# Patient Record
Sex: FEMALE | Race: WHITE | NOT HISPANIC OR LATINO | Employment: OTHER | URBAN - METROPOLITAN AREA
[De-identification: names, ages, dates, MRNs, and addresses within clinical notes are randomized per-mention and may not be internally consistent; named-entity substitution may affect disease eponyms.]

---

## 2018-05-21 ENCOUNTER — OFFICE VISIT (OUTPATIENT)
Dept: FAMILY MEDICINE CLINIC | Facility: CLINIC | Age: 73
End: 2018-05-21

## 2018-05-21 VITALS
HEIGHT: 61 IN | WEIGHT: 120.2 LBS | DIASTOLIC BLOOD PRESSURE: 82 MMHG | BODY MASS INDEX: 22.69 KG/M2 | HEART RATE: 65 BPM | SYSTOLIC BLOOD PRESSURE: 120 MMHG | OXYGEN SATURATION: 98 % | TEMPERATURE: 98.5 F

## 2018-05-21 DIAGNOSIS — Z00.00 ANNUAL PHYSICAL EXAM: ICD-10-CM

## 2018-05-21 DIAGNOSIS — M85.80 OSTEOPENIA DETERMINED BY X-RAY: ICD-10-CM

## 2018-05-21 DIAGNOSIS — Z79.899 ENCOUNTER FOR LONG-TERM (CURRENT) USE OF MEDICATIONS: ICD-10-CM

## 2018-05-21 DIAGNOSIS — E05.00 GRAVES DISEASE: Primary | ICD-10-CM

## 2018-05-21 DIAGNOSIS — E55.9 VITAMIN D DEFICIENCY: ICD-10-CM

## 2018-05-21 DIAGNOSIS — E78.5 HYPERLIPIDEMIA, UNSPECIFIED HYPERLIPIDEMIA TYPE: ICD-10-CM

## 2018-05-21 LAB
25(OH)D3+25(OH)D2 SERPL-MCNC: 58.2 NG/ML (ref 30–100)
ALBUMIN SERPL-MCNC: 4.5 G/DL (ref 3.5–5.2)
ALBUMIN/GLOB SERPL: 1.7 G/DL
ALP SERPL-CCNC: 73 U/L (ref 39–117)
ALT SERPL-CCNC: 16 U/L (ref 1–33)
AST SERPL-CCNC: 17 U/L (ref 1–32)
BILIRUB SERPL-MCNC: 0.7 MG/DL (ref 0.1–1.2)
BUN SERPL-MCNC: 14 MG/DL (ref 8–23)
BUN/CREAT SERPL: 17.5 (ref 7–25)
CALCIUM SERPL-MCNC: 9.7 MG/DL (ref 8.6–10.5)
CHLORIDE SERPL-SCNC: 104 MMOL/L (ref 98–107)
CHOLEST SERPL-MCNC: 179 MG/DL (ref 0–200)
CHOLEST/HDLC SERPL: 1.99 {RATIO}
CO2 SERPL-SCNC: 29.8 MMOL/L (ref 22–29)
CREAT SERPL-MCNC: 0.8 MG/DL (ref 0.57–1)
ERYTHROCYTE [DISTWIDTH] IN BLOOD BY AUTOMATED COUNT: 13 % (ref 11.7–13)
GFR SERPLBLD CREATININE-BSD FMLA CKD-EPI: 70 ML/MIN/1.73
GFR SERPLBLD CREATININE-BSD FMLA CKD-EPI: 85 ML/MIN/1.73
GLOBULIN SER CALC-MCNC: 2.6 GM/DL
GLUCOSE SERPL-MCNC: 91 MG/DL (ref 65–99)
HCT VFR BLD AUTO: 43.9 % (ref 35.6–45.5)
HDLC SERPL-MCNC: 90 MG/DL (ref 40–60)
HGB BLD-MCNC: 13.9 G/DL (ref 11.9–15.5)
LDLC SERPL CALC-MCNC: 76 MG/DL (ref 0–100)
MCH RBC QN AUTO: 31.5 PG (ref 26.9–32)
MCHC RBC AUTO-ENTMCNC: 31.7 G/DL (ref 32.4–36.3)
MCV RBC AUTO: 99.5 FL (ref 80.5–98.2)
PLATELET # BLD AUTO: 182 10*3/MM3 (ref 140–500)
POTASSIUM SERPL-SCNC: 4.1 MMOL/L (ref 3.5–5.2)
PROT SERPL-MCNC: 7.1 G/DL (ref 6–8.5)
RBC # BLD AUTO: 4.41 10*6/MM3 (ref 3.9–5.2)
SODIUM SERPL-SCNC: 144 MMOL/L (ref 136–145)
TRIGL SERPL-MCNC: 63 MG/DL (ref 0–150)
TSH SERPL DL<=0.005 MIU/L-ACNC: 1.75 MIU/ML (ref 0.27–4.2)
VLDLC SERPL CALC-MCNC: 12.6 MG/DL (ref 5–40)
WBC # BLD AUTO: 3.69 10*3/MM3 (ref 4.5–10.7)

## 2018-05-21 PROCEDURE — 99203 OFFICE O/P NEW LOW 30 MIN: CPT | Performed by: NURSE PRACTITIONER

## 2018-05-21 RX ORDER — ESTRADIOL 0.1 MG/G
1 CREAM VAGINAL WEEKLY
COMMUNITY
Start: 2017-12-19 | End: 2018-11-01 | Stop reason: ALTCHOICE

## 2018-05-21 RX ORDER — CLONAZEPAM 1 MG/1
1 TABLET ORAL NIGHTLY
COMMUNITY
End: 2018-08-23 | Stop reason: SDUPTHER

## 2018-05-21 RX ORDER — VALACYCLOVIR HYDROCHLORIDE 500 MG/1
500 TABLET, FILM COATED ORAL AS NEEDED
COMMUNITY
Start: 2017-12-19 | End: 2018-11-29 | Stop reason: SDUPTHER

## 2018-05-21 NOTE — PROGRESS NOTES
Subjective   Zehra Gong is a 72 y.o. female who presents today for:    Annual Exam    Ms. Gong presents today to establish care at this practice. She has several concerns today, centering around will I be able to prescribe her medications for her so that she no longer needs to see her GYN and also endocrinologist. 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 She is seen by Dr. Sawyer for her oncology issues. She had seen Dr. Winkler in the past for her thyroid and Dr. Childers was her GYN.  She does not drink or smoke. Her family history is significant for Lung cancer (father) and breast cancer (mother). She is up to date on her mammogram and dexa scan. She states she has never had any immunizations as an adult.     I have reviewed the patient's medical history in detail and updated the computerized patient record.       Ms. Gong  reports that she has never smoked. She has never used smokeless tobacco. She reports that she does not drink alcohol or use drugs.     Allergies   Allergen Reactions   • Methimazole Other (See Comments)     EYES BURNING   • Adhesive Tape Rash   • Sulfa Antibiotics Rash       Current Outpatient Prescriptions:   •  calcium carbonate-vitamin d 600-400 MG-UNIT per tablet, Take 1 tablet by mouth Daily., Disp: , Rfl:   •  clonazePAM (KlonoPIN) 1 MG tablet, Take 1 mg by mouth Every Night., Disp: , Rfl:   •  estradiol (ESTRACE) 0.1 MG/GM vaginal cream, Insert 1 g into the vagina 1 (One) Time Per Week., Disp: , Rfl:   •  valACYclovir (VALTREX) 500 MG tablet, Take 500 mg by mouth As Needed., Disp: , Rfl:   •  Multiple Vitamin (THERAGRAN PO), Take 1 tablet by mouth., Disp: , Rfl:       Review of Systems   Constitutional: Negative.    HENT: Negative.    Respiratory: Negative.    Cardiovascular: Negative.    Gastrointestinal: Negative.    Endocrine: Negative.    Genitourinary: Negative.    Musculoskeletal: Negative.    Skin:  "Negative.    Neurological: Negative.    Psychiatric/Behavioral: Negative.          Objective   Vitals:    05/21/18 0915   BP: 120/82   BP Location: Left arm   Patient Position: Sitting   Cuff Size: Small Adult   Pulse: 65   Temp: 98.5 °F (36.9 °C)   TempSrc: Oral   SpO2: 98%   Weight: 54.5 kg (120 lb 3.2 oz)   Height: 154.9 cm (61\")     Physical Exam   Constitutional: She is oriented to person, place, and time. She appears well-developed and well-nourished.   HENT:   Head: Normocephalic.   Right Ear: External ear normal.   Left Ear: External ear normal.   Nose: Nose normal.   Mouth/Throat: Oropharynx is clear and moist.   Neck: Normal range of motion. Neck supple. No JVD present. No tracheal deviation present. No thyromegaly present.   Cardiovascular: Normal rate, regular rhythm, normal heart sounds and intact distal pulses.    Pulmonary/Chest: Effort normal and breath sounds normal.   Abdominal: Soft. Bowel sounds are normal.   Musculoskeletal: Normal range of motion. She exhibits no edema.   Lymphadenopathy:     She has no cervical adenopathy.   Neurological: She is alert and oriented to person, place, and time.   Skin: Skin is warm and dry.   Psychiatric:   No acute distress   Vitals reviewed.            Zehra was seen today for annual exam.    Diagnoses and all orders for this visit:    Graves disease  -     TSH    Hyperlipidemia, unspecified hyperlipidemia type  -     Lipid Panel With / Chol / HDL Ratio    Osteopenia determined by x-ray    Vitamin D deficiency  -     Vitamin D 25 Hydroxy    Annual physical exam  -     CBC (No Diff)  -     Comprehensive Metabolic Panel    Encounter for long-term (current) use of medications  -     [id number] 7+Oxycodone-Bund - Urine, Clean Catch    1. I have reviewed her medical records that are available in the Care Everywhere system.   2. She has provide a urine for drug screen to get refills on the Klonopin. She is also to have a CBC, lipids and CMP today.   3. She is to return " in 3 months for CSE.

## 2018-05-22 LAB
AMPHETAMINES UR QL SCN: NEGATIVE NG/ML
BARBITURATES UR QL SCN: NEGATIVE NG/ML
BENZODIAZ UR QL SCN: NEGATIVE NG/ML
BZE UR QL: NEGATIVE NG/ML
CANNABINOIDS UR QL SCN: NEGATIVE NG/ML
OPIATES UR QL SCN: NEGATIVE NG/ML
OXYCODONE+OXYMORPHONE UR QL SCN: NEGATIVE NG/ML
PCP UR QL: NEGATIVE NG/ML

## 2018-08-23 ENCOUNTER — OFFICE VISIT (OUTPATIENT)
Dept: FAMILY MEDICINE CLINIC | Facility: CLINIC | Age: 73
End: 2018-08-23

## 2018-08-23 VITALS
TEMPERATURE: 98.7 F | HEIGHT: 61 IN | WEIGHT: 120.9 LBS | BODY MASS INDEX: 22.83 KG/M2 | OXYGEN SATURATION: 97 % | SYSTOLIC BLOOD PRESSURE: 102 MMHG | DIASTOLIC BLOOD PRESSURE: 76 MMHG | HEART RATE: 72 BPM

## 2018-08-23 DIAGNOSIS — G47.00 INSOMNIA, UNSPECIFIED TYPE: Primary | ICD-10-CM

## 2018-08-23 PROBLEM — F41.9 ANXIETY: Status: ACTIVE | Noted: 2018-08-23

## 2018-08-23 PROCEDURE — 99214 OFFICE O/P EST MOD 30 MIN: CPT | Performed by: NURSE PRACTITIONER

## 2018-08-23 RX ORDER — CLONAZEPAM 1 MG/1
1 TABLET ORAL NIGHTLY
Qty: 30 TABLET | Refills: 5 | Status: SHIPPED | OUTPATIENT
Start: 2018-08-23 | End: 2018-11-29 | Stop reason: SDUPTHER

## 2018-08-23 NOTE — PROGRESS NOTES
"Subjective   Zehra Gong is a 72 y.o. female who presents today for:    Insomnia (CSE)    Ms. Gong presents today for a CSE to get refills on Clonazepam that she takes for insomnia. She was prescribed Clonazepam 1 mg nightly by her previous PCP. She denies having any anxiety issues.      I have reviewed the patient's medical history in detail and updated the computerized patient record.      Ms. Gong  reports that she has never smoked. She has never used smokeless tobacco. She reports that she does not drink alcohol or use drugs.     Allergies   Allergen Reactions   • Methimazole Other (See Comments)     EYES BURNING   • Adhesive Tape Rash   • Sulfa Antibiotics Rash       Current Outpatient Prescriptions:   •  calcium carbonate-vitamin d 600-400 MG-UNIT per tablet, Take 1 tablet by mouth Daily., Disp: , Rfl:   •  clonazePAM (KlonoPIN) 1 MG tablet, Take 1 tablet by mouth Every Night., Disp: 30 tablet, Rfl: 5  •  estradiol (ESTRACE) 0.1 MG/GM vaginal cream, Insert 1 g into the vagina 1 (One) Time Per Week., Disp: , Rfl:   •  Multiple Vitamin (MULTI VITAMIN PO), Take  by mouth., Disp: , Rfl:   •  valACYclovir (VALTREX) 500 MG tablet, Take 500 mg by mouth As Needed., Disp: , Rfl:       Review of Systems   Constitutional: Negative.    Psychiatric/Behavioral: Positive for sleep disturbance. The patient is not nervous/anxious.          Objective   Vitals:    08/23/18 0845   BP: 102/76   BP Location: Left arm   Patient Position: Sitting   Cuff Size: Adult   Pulse: 72   Temp: 98.7 °F (37.1 °C)   TempSrc: Oral   SpO2: 97%   Weight: 54.8 kg (120 lb 14.4 oz)   Height: 154.9 cm (60.98\")     Physical Exam   Constitutional: She is oriented to person, place, and time. She appears well-developed and well-nourished.   Cardiovascular: Normal rate, regular rhythm and normal heart sounds.    Pulmonary/Chest: Effort normal and breath sounds normal.   Neurological: She is alert and oriented to person, place, and time.   Skin: " Skin is warm and dry.   Psychiatric: She has a normal mood and affect. Judgment and thought content normal. She is hyperactive. Cognition and memory are normal.   Vitals reviewed.            Zehra was seen today for insomnia.    Diagnoses and all orders for this visit:    Insomnia, unspecified type  -     clonazePAM (KlonoPIN) 1 MG tablet; Take 1 tablet by mouth Every Night.    1. Ms. Gong is here today to get refills on clonazepam 1 mg nightly for sleep. She has provided a urine at her last visit for drug screen which is negative. She signed a controled substance agreement with me today.  I have reviewed her REGAN report which is consistent with the medications being prescribed for her today. I have reordered Clonazepam 1 mg nightly.   2. She is to return in 3 months for CSE and she is also to have her medicare annual wellness exam on that same day.

## 2018-11-01 ENCOUNTER — OFFICE VISIT (OUTPATIENT)
Dept: FAMILY MEDICINE CLINIC | Facility: CLINIC | Age: 73
End: 2018-11-01

## 2018-11-01 VITALS
SYSTOLIC BLOOD PRESSURE: 122 MMHG | OXYGEN SATURATION: 99 % | HEIGHT: 61 IN | BODY MASS INDEX: 23.6 KG/M2 | HEART RATE: 53 BPM | DIASTOLIC BLOOD PRESSURE: 78 MMHG | WEIGHT: 125 LBS | TEMPERATURE: 98 F

## 2018-11-01 DIAGNOSIS — R35.0 FREQUENT URINATION: Primary | ICD-10-CM

## 2018-11-01 DIAGNOSIS — N95.1 VAGINAL DRYNESS, MENOPAUSAL: ICD-10-CM

## 2018-11-01 LAB
BILIRUB BLD-MCNC: NEGATIVE MG/DL
CLARITY, POC: CLEAR
COLOR UR: YELLOW
GLUCOSE UR STRIP-MCNC: NEGATIVE MG/DL
KETONES UR QL: NEGATIVE
LEUKOCYTE EST, POC: NEGATIVE
NITRITE UR-MCNC: NEGATIVE MG/ML
PH UR: 6 [PH] (ref 5–8)
PROT UR STRIP-MCNC: NEGATIVE MG/DL
RBC # UR STRIP: ABNORMAL /UL
SP GR UR: 1.03 (ref 1–1.03)
UROBILINOGEN UR QL: NORMAL

## 2018-11-01 PROCEDURE — 99213 OFFICE O/P EST LOW 20 MIN: CPT | Performed by: NURSE PRACTITIONER

## 2018-11-01 PROCEDURE — 81003 URINALYSIS AUTO W/O SCOPE: CPT | Performed by: NURSE PRACTITIONER

## 2018-11-01 RX ORDER — INFLUENZA A VIRUS A/MICHIGAN/45/2015 X-275 (H1N1) ANTIGEN (FORMALDEHYDE INACTIVATED), INFLUENZA A VIRUS A/SINGAPORE/INFIMH-16-0019/2016 IVR-186 (H3N2) ANTIGEN (FORMALDEHYDE INACTIVATED), AND INFLUENZA B VIRUS B/MARYLAND/15/2016 BX-69A (A B/COLORADO/6/2017-LIKE VIRUS) ANTIGEN (FORMALDEHYDE INACTIVATED) 60; 60; 60 UG/.5ML; UG/.5ML; UG/.5ML
INJECTION, SUSPENSION INTRAMUSCULAR
Refills: 0 | COMMUNITY
Start: 2018-10-16 | End: 2019-02-28

## 2018-11-01 RX ORDER — ESTRADIOL 0.1 MG/G
1 CREAM VAGINAL WEEKLY
Qty: 42.5 G | Refills: 12 | Status: SHIPPED | OUTPATIENT
Start: 2018-11-01 | End: 2019-09-10 | Stop reason: SDUPTHER

## 2018-11-01 RX ORDER — PNEUMOCOCCAL 13-VALENT CONJUGATE VACCINE 2.2; 2.2; 2.2; 2.2; 2.2; 4.4; 2.2; 2.2; 2.2; 2.2; 2.2; 2.2; 2.2 UG/.5ML; UG/.5ML; UG/.5ML; UG/.5ML; UG/.5ML; UG/.5ML; UG/.5ML; UG/.5ML; UG/.5ML; UG/.5ML; UG/.5ML; UG/.5ML; UG/.5ML
INJECTION, SUSPENSION INTRAMUSCULAR
Refills: 0 | COMMUNITY
Start: 2018-10-16 | End: 2019-02-28

## 2018-11-01 RX ORDER — ZOSTER VACCINE RECOMBINANT, ADJUVANTED 50 MCG/0.5
KIT INTRAMUSCULAR
Refills: 0 | COMMUNITY
Start: 2018-10-23 | End: 2019-02-28

## 2018-11-01 NOTE — PROGRESS NOTES
Subjective   Zehra Gong is a 72 y.o. female.     Chief Complaint   Patient presents with   • Urinary Tract Infection     Frequency and odor   X's 10 days     Ms. Gong presents today with complaint of her urine having an odor for the past week or more. She denies urinary frequency, pain, burning or urgency. She has been using a generic form of Estrace and believes that is why she is having the odor, but wanted to see if she has a UTI.    I have reviewed the patient's medical history in detail and updated the computerized patient record.     The following portions of the patient's history were reviewed and updated as appropriate: allergies, current medications, past family history, past medical history, past social history, past surgical history and problem list.       Current Outpatient Prescriptions:   •  calcium carbonate-vitamin d 600-400 MG-UNIT per tablet, Take 1 tablet by mouth Daily., Disp: , Rfl:   •  clonazePAM (KlonoPIN) 1 MG tablet, Take 1 tablet by mouth Every Night., Disp: 30 tablet, Rfl: 5  •  Multiple Vitamin (MULTI VITAMIN PO), Take  by mouth., Disp: , Rfl:   •  valACYclovir (VALTREX) 500 MG tablet, Take 500 mg by mouth As Needed., Disp: , Rfl:   •  ESTRACE VAGINAL 0.1 MG/GM vaginal cream, Insert 1 g into the vagina 1 (One) Time Per Week., Disp: 42.5 g, Rfl: 12  •  FLUZONE HIGH-DOSE 0.5 ML suspension prefilled syringe injection, inject 0.5 milliliter intramuscularly, Disp: , Rfl: 0  •  PREVNAR 13 vaccine, inject 0.5 milliliter intramuscularly, Disp: , Rfl: 0  •  SHINGRIX 50 MCG/0.5ML reconstituted suspension, inject 0.5 milliliter intramuscularly, Disp: , Rfl: 0    Review of Systems   Constitutional: Negative.  Negative for chills and fever.   Genitourinary: Positive for frequency and urgency. Negative for decreased urine volume, dysuria, hematuria and vaginal pain.   Skin: Negative.        Objective    Vitals:    11/01/18 1149   BP: 122/78   Pulse: 53   Temp: 98 °F (36.7 °C)   SpO2: 99%      Physical Exam   Constitutional: She is oriented to person, place, and time. She appears well-developed and well-nourished.   Neurological: She is alert and oriented to person, place, and time.   Skin: Skin is warm and dry.   Psychiatric:   No acute distrss   Vitals reviewed.        Assessment/Plan   Zehra was seen today for urinary tract infection.    Diagnoses and all orders for this visit:    Frequent urination  -     POC Urinalysis Dipstick, Automated  -     Urine Culture - Urine, Urine, Clean Catch  -     Urinalysis With Microscopic - Urine, Clean Catch    Vaginal dryness, menopausal  -     ESTRACE VAGINAL 0.1 MG/GM vaginal cream; Insert 1 g into the vagina 1 (One) Time Per Week.    1. Her POC urine is negative for a UTI. I will send her urine out for culture and do a microscopic urine.  2. I have ordered the branded Estrace as a do not substitute because she does not tolerate the generic form.  3. Follow up as needed.

## 2018-11-03 LAB
APPEARANCE UR: CLEAR
BACTERIA #/AREA URNS HPF: NORMAL /HPF
BACTERIA UR CULT: NO GROWTH
BACTERIA UR CULT: NORMAL
BILIRUB UR QL STRIP: NEGATIVE
CASTS URNS MICRO: NORMAL
COLOR UR: YELLOW
EPI CELLS #/AREA URNS HPF: NORMAL /HPF
GLUCOSE UR QL: NEGATIVE
HGB UR QL STRIP: (no result)
KETONES UR QL STRIP: NEGATIVE
LEUKOCYTE ESTERASE UR QL STRIP: NEGATIVE
NITRITE UR QL STRIP: NEGATIVE
PH UR STRIP: 6.5 [PH] (ref 5–8)
PROT UR QL STRIP: NEGATIVE
RBC #/AREA URNS HPF: NORMAL /HPF
SP GR UR: 1.01 (ref 1–1.03)
UROBILINOGEN UR STRIP-MCNC: (no result) MG/DL
WBC #/AREA URNS HPF: NORMAL /HPF

## 2018-11-29 ENCOUNTER — OFFICE VISIT (OUTPATIENT)
Dept: FAMILY MEDICINE CLINIC | Facility: CLINIC | Age: 73
End: 2018-11-29

## 2018-11-29 VITALS
TEMPERATURE: 98.4 F | OXYGEN SATURATION: 99 % | DIASTOLIC BLOOD PRESSURE: 64 MMHG | SYSTOLIC BLOOD PRESSURE: 102 MMHG | BODY MASS INDEX: 22.66 KG/M2 | WEIGHT: 120 LBS | HEIGHT: 61 IN | HEART RATE: 67 BPM

## 2018-11-29 VITALS
BODY MASS INDEX: 22.66 KG/M2 | HEIGHT: 61 IN | WEIGHT: 120 LBS | SYSTOLIC BLOOD PRESSURE: 102 MMHG | DIASTOLIC BLOOD PRESSURE: 64 MMHG | OXYGEN SATURATION: 99 % | HEART RATE: 67 BPM

## 2018-11-29 DIAGNOSIS — F41.9 ANXIETY: Primary | ICD-10-CM

## 2018-11-29 DIAGNOSIS — Z00.00 MEDICARE ANNUAL WELLNESS VISIT, SUBSEQUENT: Primary | ICD-10-CM

## 2018-11-29 DIAGNOSIS — G47.00 INSOMNIA, UNSPECIFIED TYPE: ICD-10-CM

## 2018-11-29 PROCEDURE — 99214 OFFICE O/P EST MOD 30 MIN: CPT | Performed by: NURSE PRACTITIONER

## 2018-11-29 PROCEDURE — G0439 PPPS, SUBSEQ VISIT: HCPCS | Performed by: NURSE PRACTITIONER

## 2018-11-29 RX ORDER — CLONAZEPAM 1 MG/1
1 TABLET ORAL NIGHTLY
Qty: 30 TABLET | Refills: 3 | Status: SHIPPED | OUTPATIENT
Start: 2018-11-29 | End: 2019-02-28 | Stop reason: SDUPTHER

## 2018-11-29 RX ORDER — VALACYCLOVIR HYDROCHLORIDE 500 MG/1
500 TABLET, FILM COATED ORAL 2 TIMES DAILY
Qty: 60 TABLET | Refills: 3 | Status: SHIPPED | OUTPATIENT
Start: 2018-11-29 | End: 2019-11-06 | Stop reason: SDUPTHER

## 2018-11-29 NOTE — PATIENT INSTRUCTIONS
Medicare Wellness  Personal Prevention Plan of Service     Date of Office Visit:  2018  Encounter Provider:  THIEN Salgado  Place of Service:  [facility]  Patient Name: Zehra Gong  :  1945    As part of the Medicare Wellness portion of your visit today, we are providing you with this personalized preventive plan of services (PPPS). This plan is based upon recommendations of the [place] Preventive Services Task Force (USPSTF) and the Advisory Committee on Immunization Practices (ACIP).    This lists the preventive care services that should be considered, and provides dates of when you are due. Items listed as completed are up-to-date and do not require any further intervention.    Health Maintenance   Topic Date Due   • TDAP/TD VACCINES (1 - Tdap) 1963   • MEDICARE ANNUAL WELLNESS  2018   • ZOSTER VACCINE (2 of 2) 2018   • LIPID PANEL  2019   • MAMMOGRAM  2019   • PNEUMOCOCCAL VACCINES (65+ LOW/MEDIUM RISK) (2 of 2 - PPSV23) 10/16/2019   • DXA SCAN  2019   • COLONOSCOPY  2025   • INFLUENZA VACCINE  Completed       No orders of the defined types were placed in this encounter.      No Follow-up on file.

## 2018-11-29 NOTE — PROGRESS NOTES
QUICK REFERENCE INFORMATION:  The ABCs of the Annual Wellness Visit    Subsequent Medicare Wellness Visit    HEALTH RISK ASSESSMENT    1945    Recent Hospitalizations:  No hospitalization(s) within the last year..        Current Medical Providers:  Patient Care Team:  Maru Kim APRN as PCP - General (Family Medicine)  Panda Rinaldi MD as Consulting Physician (Rheumatology)  Ewelina Childers MD as Consulting Physician (Obstetrics and Gynecology)  Georgette Winkler MD as Consulting Physician (Endocrinology)  George Sawyer MD as Consulting Physician (Hematology and Oncology)        Smoking Status:  Social History     Tobacco Use   Smoking Status Never Smoker   Smokeless Tobacco Never Used       Alcohol Consumption:  Social History     Substance and Sexual Activity   Alcohol Use No       Depression Screen:   PHQ-2/PHQ-9 Depression Screening 11/29/2018   Little interest or pleasure in doing things 0   Feeling down, depressed, or hopeless 0   Total Score 0       Health Habits and Functional and Cognitive Screening:  Functional & Cognitive Status 11/29/2018   Do you have difficulty preparing food and eating? No   Do you have difficulty bathing yourself, getting dressed or grooming yourself? No   Do you have difficulty using the toilet? No   Do you have difficulty moving around from place to place? No   Do you have trouble with steps or getting out of a bed or a chair? No   In the past year have you fallen or experienced a near fall? No   Current Diet Well Balanced Diet   Dental Exam Up to date   Eye Exam Up to date   Exercise (times per week) 5 times per week   Current Exercise Activities Include (No Data)   Do you need help using the phone?  No   Are you deaf or do you have serious difficulty hearing?  No   Do you need help with transportation? No   Do you need help shopping? No   Do you need help preparing meals?  No   Do you need help with housework?  No   Do you need help taking your medications?  No   Do you need help managing money? No   Do you ever drive or ride in a car without wearing a seat belt? No   Have you felt unusual stress, anger or loneliness in the last month? No   Who do you live with? Spouse   If you need help, do you have trouble finding someone available to you? No   Have you been bothered in the last four weeks by sexual problems? No   Do you have difficulty concentrating, remembering or making decisions? No           Does the patient have evidence of cognitive impairment? No    Aspirin use counseling: Start ASA 81 mg daily       Recent Lab Results:  CMP:  Lab Results   Component Value Date    GLU 91 05/21/2018    BUN 14 05/21/2018    CREATININE 0.80 05/21/2018    EGFRIFNONA 70 05/21/2018    EGFRIFAFRI 85 05/21/2018    BCR 17.5 05/21/2018     05/21/2018    K 4.1 05/21/2018    CO2 29.8 (H) 05/21/2018    CALCIUM 9.7 05/21/2018    PROTENTOTREF 7.1 05/21/2018    ALBUMIN 4.50 05/21/2018    LABGLOBREF 2.6 05/21/2018    LABIL2 1.7 05/21/2018    BILITOT 0.7 05/21/2018    ALKPHOS 73 05/21/2018    AST 17 05/21/2018    ALT 16 05/21/2018     Lipid Panel:  Lab Results   Component Value Date    TRIG 63 05/21/2018    HDL 90 (H) 05/21/2018    VLDL 12.6 05/21/2018     HbA1c:       Visual Acuity:  No exam data present    Age-appropriate Screening Schedule:  Refer to the list below for future screening recommendations based on patient's age, sex and/or medical conditions. Orders for these recommended tests are listed in the plan section. The patient has been provided with a written plan.    Health Maintenance   Topic Date Due   • TDAP/TD VACCINES (1 - Tdap) 12/02/1963   • DXA SCAN  05/21/2018   • ZOSTER VACCINE (2 of 2) 12/18/2018   • LIPID PANEL  05/21/2019   • MAMMOGRAM  08/08/2019   • PNEUMOCOCCAL VACCINES (65+ LOW/MEDIUM RISK) (2 of 2 - PPSV23) 10/16/2019   • COLONOSCOPY  04/08/2025   • INFLUENZA VACCINE  Completed        Subjective   History of Present Illness    Zehra Gong is a 72 y.o. female  who presents for an Subsequent Wellness Visit.    The following portions of the patient's history were reviewed and updated as appropriate: allergies, current medications, past family history, past medical history, past social history, past surgical history and problem list.    Outpatient Medications Prior to Visit   Medication Sig Dispense Refill   • calcium carbonate-vitamin d 600-400 MG-UNIT per tablet Take 1 tablet by mouth Daily.     • clonazePAM (KlonoPIN) 1 MG tablet Take 1 tablet by mouth Every Night. 30 tablet 5   • ESTRACE VAGINAL 0.1 MG/GM vaginal cream Insert 1 g into the vagina 1 (One) Time Per Week. 42.5 g 12   • FLUZONE HIGH-DOSE 0.5 ML suspension prefilled syringe injection inject 0.5 milliliter intramuscularly  0   • Multiple Vitamin (MULTI VITAMIN PO) Take  by mouth.     • PREVNAR 13 vaccine inject 0.5 milliliter intramuscularly  0   • SHINGRIX 50 MCG/0.5ML reconstituted suspension inject 0.5 milliliter intramuscularly  0   • valACYclovir (VALTREX) 500 MG tablet Take 500 mg by mouth As Needed.       No facility-administered medications prior to visit.        Patient Active Problem List   Diagnosis   • Insomnia   • Anxiety       Advance Care Planning:  has an advance directive - a copy HAS NOT been provided    Identification of Risk Factors:  Risk factors include: cardiovascular risk.    Review of Systems   Constitutional: Negative.    Respiratory: Negative.    Cardiovascular: Negative.    Gastrointestinal: Negative.    Musculoskeletal: Negative.    Skin: Negative.    Neurological: Negative.    All other systems reviewed and are negative.      Compared to one year ago, the patient feels her physical health is the same.  Compared to one year ago, the patient feels her mental health is the same.    Objective     Physical Exam   Constitutional: She is oriented to person, place, and time. She appears well-developed and well-nourished.   Neurological: She is alert and oriented to person, place, and time.  "  Skin: Skin is warm and dry.   Psychiatric:   No acute distress   Vitals reviewed.      Vitals:    11/29/18 1116   BP: 102/64   Pulse: 67   Temp: 98.4 °F (36.9 °C)   SpO2: 99%   Weight: 54.4 kg (120 lb)   Height: 154.9 cm (61\")       Patient's Body mass index is 22.67 kg/m². BMI is within normal parameters. No follow-up required.      Assessment/Plan   Patient Self-Management and Personalized Health Advice  The patient has been provided with information about: prevention of cardiac or vascular disease and preventive services including:   · none.    Visit Diagnoses:  No diagnosis found.    No orders of the defined types were placed in this encounter.      Outpatient Encounter Medications as of 11/29/2018   Medication Sig Dispense Refill   • calcium carbonate-vitamin d 600-400 MG-UNIT per tablet Take 1 tablet by mouth Daily.     • clonazePAM (KlonoPIN) 1 MG tablet Take 1 tablet by mouth Every Night. 30 tablet 5   • ESTRACE VAGINAL 0.1 MG/GM vaginal cream Insert 1 g into the vagina 1 (One) Time Per Week. 42.5 g 12   • FLUZONE HIGH-DOSE 0.5 ML suspension prefilled syringe injection inject 0.5 milliliter intramuscularly  0   • Multiple Vitamin (MULTI VITAMIN PO) Take  by mouth.     • PREVNAR 13 vaccine inject 0.5 milliliter intramuscularly  0   • SHINGRIX 50 MCG/0.5ML reconstituted suspension inject 0.5 milliliter intramuscularly  0   • valACYclovir (VALTREX) 500 MG tablet Take 500 mg by mouth As Needed.       No facility-administered encounter medications on file as of 11/29/2018.        Reviewed use of high risk medication in the elderly: yes  Reviewed for potential of harmful drug interactions in the elderly: yes    Follow Up:  No Follow-up on file.     An After Visit Summary and PPPS with all of these plans were given to the patient.         "

## 2018-11-29 NOTE — PROGRESS NOTES
Subjective   Zehra Gong is a 72 y.o. female.     Chief Complaint   Patient presents with   • Med Refill     CSE     History of Present Illness     The following portions of the patient's history were reviewed and updated as appropriate: allergies, current medications, past family history, past medical history, past social history, past surgical history and problem list.       Current Outpatient Medications:   •  calcium carbonate-vitamin d 600-400 MG-UNIT per tablet, Take 1 tablet by mouth Daily., Disp: , Rfl:   •  clonazePAM (KlonoPIN) 1 MG tablet, Take 1 tablet by mouth Every Night., Disp: 30 tablet, Rfl: 3  •  ESTRACE VAGINAL 0.1 MG/GM vaginal cream, Insert 1 g into the vagina 1 (One) Time Per Week., Disp: 42.5 g, Rfl: 12  •  FLUZONE HIGH-DOSE 0.5 ML suspension prefilled syringe injection, inject 0.5 milliliter intramuscularly, Disp: , Rfl: 0  •  Multiple Vitamin (MULTI VITAMIN PO), Take  by mouth., Disp: , Rfl:   •  PREVNAR 13 vaccine, inject 0.5 milliliter intramuscularly, Disp: , Rfl: 0  •  SHINGRIX 50 MCG/0.5ML reconstituted suspension, inject 0.5 milliliter intramuscularly, Disp: , Rfl: 0  •  valACYclovir (VALTREX) 500 MG tablet, Take 1 tablet by mouth 2 (Two) Times a Day., Disp: 60 tablet, Rfl: 3    Review of Systems    Objective   Physical Exam      Assessment/Plan   Zehra was seen today for med refill.    Diagnoses and all orders for this visit:    Anxiety    Insomnia, unspecified type  -     clonazePAM (KlonoPIN) 1 MG tablet; Take 1 tablet by mouth Every Night.    Other orders  -     valACYclovir (VALTREX) 500 MG tablet; Take 1 tablet by mouth 2 (Two) Times a Day.      I have spent 30 minutes face to face with this patient of which 20 minutes was spent explaining to her appropriate times that an antibiotic should/would be prescribed to her. She was concerned that she would not get an antibiotic if she had bronchitis and that her previous PCP would give her an antibiotic. I explained to her that I  will prescribe antibiotics if medically indicated, but that if a patient has a viral infection that I will not prescribe an antibiotic. I explained that this is per CDC guidelines.   I reviewed her REGAN report and it is consistent with the medications being prescribed for her. I have refilled the Clonazepam 1 mg nightly.   Follow up in 3 months for her next CSE.

## 2019-02-28 ENCOUNTER — OFFICE VISIT (OUTPATIENT)
Dept: FAMILY MEDICINE CLINIC | Facility: CLINIC | Age: 74
End: 2019-02-28

## 2019-02-28 VITALS
HEART RATE: 67 BPM | HEIGHT: 61 IN | DIASTOLIC BLOOD PRESSURE: 62 MMHG | WEIGHT: 121 LBS | OXYGEN SATURATION: 99 % | BODY MASS INDEX: 22.84 KG/M2 | SYSTOLIC BLOOD PRESSURE: 100 MMHG

## 2019-02-28 DIAGNOSIS — G47.00 INSOMNIA, UNSPECIFIED TYPE: ICD-10-CM

## 2019-02-28 PROCEDURE — 99214 OFFICE O/P EST MOD 30 MIN: CPT | Performed by: NURSE PRACTITIONER

## 2019-02-28 RX ORDER — CLONAZEPAM 1 MG/1
1 TABLET ORAL NIGHTLY
Qty: 30 TABLET | Refills: 3 | Status: SHIPPED | OUTPATIENT
Start: 2019-02-28 | End: 2019-05-22 | Stop reason: SDUPTHER

## 2019-02-28 NOTE — PROGRESS NOTES
Subjective   Zehra Gong is a 73 y.o. female.     Chief Complaint   Patient presents with   • Anxiety     CSE   • Insomnia     Ms. Gong presents today for a follow up on insomnia due to  anxiety and a CSE to get refills on the clonazepam. She state she is doing well. She does not report any worsening anxiety and difficulty sleeping while she is taking the Clonazepam.     I have reviewed the patient's medical history in detail and updated the computerized patient record.     The following portions of the patient's history were reviewed and updated as appropriate: allergies, current medications, past family history, past medical history, past social history, past surgical history and problem list.       Current Outpatient Medications:   •  calcium carbonate-vitamin d 600-400 MG-UNIT per tablet, Take 1 tablet by mouth Daily., Disp: , Rfl:   •  clonazePAM (KlonoPIN) 1 MG tablet, Take 1 tablet by mouth Every Night., Disp: 30 tablet, Rfl: 3  •  ESTRACE VAGINAL 0.1 MG/GM vaginal cream, Insert 1 g into the vagina 1 (One) Time Per Week., Disp: 42.5 g, Rfl: 12  •  Multiple Vitamin (MULTI VITAMIN PO), Take  by mouth., Disp: , Rfl:   •  valACYclovir (VALTREX) 500 MG tablet, Take 1 tablet by mouth 2 (Two) Times a Day. (Patient taking differently: Take 500 mg by mouth 2 (Two) Times a Day As Needed.), Disp: 60 tablet, Rfl: 3    Review of Systems   Constitutional: Negative.    Respiratory: Negative.    Cardiovascular: Negative.    Psychiatric/Behavioral: Positive for sleep disturbance (controled with medication). The patient is nervous/anxious (controled with medication).        Objective    Vitals:    02/28/19 1040   BP: 100/62   Pulse: 67   SpO2: 99%     Physical Exam   Constitutional: She is oriented to person, place, and time. She appears well-developed and well-nourished.   Cardiovascular: Normal rate, regular rhythm and normal heart sounds.   Pulmonary/Chest: Effort normal and breath sounds normal.   Neurological: She  is alert and oriented to person, place, and time.   Skin: Skin is warm and dry.   Psychiatric:   No acute distress   Vitals reviewed.        Assessment/Plan   Zehra was seen today for anxiety and insomnia.    Diagnoses and all orders for this visit:    Insomnia, unspecified type  -     clonazePAM (KlonoPIN) 1 MG tablet; Take 1 tablet by mouth Every Night.      1. I have reviewed her REGAN report which is consistent with the medications being prescribed for her. She is to continue with Clonazepam 1 mg nightly for anxiety/sleep.   2. She is to continue all other medications as prescribed.  3. She is to return in 3 months for her next CSE.

## 2019-05-22 DIAGNOSIS — G47.00 INSOMNIA, UNSPECIFIED TYPE: ICD-10-CM

## 2019-05-22 RX ORDER — CLONAZEPAM 1 MG/1
TABLET ORAL
Qty: 30 TABLET | Refills: 0 | Status: SHIPPED | OUTPATIENT
Start: 2019-05-22 | End: 2019-05-29 | Stop reason: SDUPTHER

## 2019-05-29 ENCOUNTER — OFFICE VISIT (OUTPATIENT)
Dept: FAMILY MEDICINE CLINIC | Facility: CLINIC | Age: 74
End: 2019-05-29

## 2019-05-29 VITALS
OXYGEN SATURATION: 98 % | SYSTOLIC BLOOD PRESSURE: 116 MMHG | HEART RATE: 66 BPM | DIASTOLIC BLOOD PRESSURE: 66 MMHG | TEMPERATURE: 98 F | WEIGHT: 118 LBS | BODY MASS INDEX: 22.28 KG/M2 | RESPIRATION RATE: 18 BRPM | HEIGHT: 61 IN

## 2019-05-29 DIAGNOSIS — G47.00 INSOMNIA, UNSPECIFIED TYPE: ICD-10-CM

## 2019-05-29 PROCEDURE — 99214 OFFICE O/P EST MOD 30 MIN: CPT | Performed by: NURSE PRACTITIONER

## 2019-05-29 RX ORDER — CLONAZEPAM 1 MG/1
1 TABLET ORAL
Qty: 45 TABLET | Refills: 0 | Status: SHIPPED | OUTPATIENT
Start: 2019-05-29 | End: 2019-07-10 | Stop reason: SDUPTHER

## 2019-07-10 DIAGNOSIS — G47.00 INSOMNIA, UNSPECIFIED TYPE: ICD-10-CM

## 2019-07-10 RX ORDER — CLONAZEPAM 1 MG/1
TABLET ORAL
Qty: 45 TABLET | Refills: 0 | Status: SHIPPED | OUTPATIENT
Start: 2019-07-10 | End: 2019-09-23

## 2019-07-24 ENCOUNTER — OFFICE VISIT (OUTPATIENT)
Dept: FAMILY MEDICINE CLINIC | Facility: CLINIC | Age: 74
End: 2019-07-24

## 2019-07-24 VITALS
OXYGEN SATURATION: 98 % | HEIGHT: 61 IN | BODY MASS INDEX: 22.19 KG/M2 | SYSTOLIC BLOOD PRESSURE: 110 MMHG | RESPIRATION RATE: 18 BRPM | WEIGHT: 117.5 LBS | DIASTOLIC BLOOD PRESSURE: 68 MMHG | TEMPERATURE: 98.4 F | HEART RATE: 71 BPM

## 2019-07-24 DIAGNOSIS — G47.00 INSOMNIA, UNSPECIFIED TYPE: ICD-10-CM

## 2019-07-24 DIAGNOSIS — F41.9 ANXIETY: Primary | ICD-10-CM

## 2019-07-24 PROCEDURE — 99214 OFFICE O/P EST MOD 30 MIN: CPT | Performed by: NURSE PRACTITIONER

## 2019-07-24 NOTE — PROGRESS NOTES
Subjective   Zehra Gong is a 73 y.o. female.     Chief Complaint   Patient presents with   • Insomnia     Sleep meds not doing good      Ms. Gong presents today because she states her Clonazepam is no longer helping her sleep. She states that she wakes up after several hours of sleeping and needs to take another clonazepam. She has arthralgias, sore throat, nausea and anxiety that is worsening. She reports seeing a psychiatrist recently who advised her to wean off of the Clonazepam and start taking Remeron. She look up the side affects of Remeron on the computer and has decided not to take the medication. But she feels she still needs something to help her sleep. She reports not continuing with this psychiatrist because his practice is limited and she cannot go see him on the one day a week he does see patient.     I have reviewed the patient's medical history in detail and updated the computerized patient record.     The following portions of the patient's history were reviewed and updated as appropriate: allergies, current medications, past family history, past medical history, past social history, past surgical history and problem list.       Current Outpatient Medications:   •  calcium carbonate-vitamin d 600-400 MG-UNIT per tablet, Take 1 tablet by mouth Daily., Disp: , Rfl:   •  ESTRACE VAGINAL 0.1 MG/GM vaginal cream, Insert 1 g into the vagina 1 (One) Time Per Week., Disp: 42.5 g, Rfl: 12  •  KLONOPIN 1 MG tablet, TAKE ONE TABLET BY MOUTH AT BEDTIME. MAY TAKE A SECOND DOSE AS NEEDED, Disp: 45 tablet, Rfl: 0  •  valACYclovir (VALTREX) 500 MG tablet, Take 1 tablet by mouth 2 (Two) Times a Day. (Patient taking differently: Take 500 mg by mouth 2 (Two) Times a Day As Needed.), Disp: 60 tablet, Rfl: 3    Review of Systems   Constitutional: Negative.    Gastrointestinal: Positive for nausea. Abdominal pain: discomfort.   Neurological: Negative.    Psychiatric/Behavioral: Positive for sleep disturbance. The  "patient is nervous/anxious.         Vitals:    07/24/19 1101   BP: 110/68   BP Location: Left arm   Patient Position: Sitting   Cuff Size: Adult   Pulse: 71   Resp: 18   Temp: 98.4 °F (36.9 °C)   TempSrc: Oral   SpO2: 98%   Weight: 53.3 kg (117 lb 8 oz)   Height: 154.9 cm (61\")       Objective   Physical Exam   Constitutional: She is oriented to person, place, and time. She appears well-developed and well-nourished.   Cardiovascular: Normal rate, regular rhythm and normal heart sounds.   Pulmonary/Chest: Effort normal and breath sounds normal.   Neurological: She is alert and oriented to person, place, and time.   Skin: Skin is warm and dry.   Psychiatric: Her speech is normal and behavior is normal. Judgment and thought content normal. Her mood appears anxious. Cognition and memory are normal.   Vitals reviewed.        Assessment/Plan   Zehra was seen today for insomnia.    Diagnoses and all orders for this visit:    Anxiety    Insomnia, unspecified type      I have spent 20 minutes face to face with this patient of which the entire time was spent discussing her anxiety, insomnia and treatment options. She has been on the Clonazepam for at least 22 yrs. She reports that she still does not want to take the Remeron. I have advised her to follow up with another psychiatrist to discuss her concerns. She has been provided with the numbers of several psychiatrist in the area.   Follow up at her next scheduled visit.          "

## 2019-08-15 ENCOUNTER — OFFICE VISIT (OUTPATIENT)
Dept: FAMILY MEDICINE CLINIC | Facility: CLINIC | Age: 74
End: 2019-08-15

## 2019-08-15 VITALS
BODY MASS INDEX: 22.24 KG/M2 | OXYGEN SATURATION: 99 % | DIASTOLIC BLOOD PRESSURE: 78 MMHG | RESPIRATION RATE: 18 BRPM | HEART RATE: 64 BPM | SYSTOLIC BLOOD PRESSURE: 120 MMHG | WEIGHT: 117.8 LBS | HEIGHT: 61 IN

## 2019-08-15 DIAGNOSIS — G47.00 INSOMNIA, UNSPECIFIED TYPE: Primary | ICD-10-CM

## 2019-08-15 PROCEDURE — 99215 OFFICE O/P EST HI 40 MIN: CPT | Performed by: NURSE PRACTITIONER

## 2019-08-15 RX ORDER — MIRTAZAPINE 15 MG/1
7.5 TABLET, FILM COATED ORAL
Refills: 0 | COMMUNITY
Start: 2019-07-29 | End: 2019-08-15 | Stop reason: SINTOL

## 2019-08-15 NOTE — PROGRESS NOTES
Subjective   Zehra Gong is a 73 y.o. female.     Chief Complaint   Patient presents with   • Insomnia     Ms. Gong presents today because she is not sleeping. I saw her three weeks ago about this same subject. She had seen a psychiatrist in July and was told to decrease the amount of clonazepam she takes nightly to sleep and start taking Remeron. After out 7/2019 visit she started to take the Remeron on a nightly basis. She believes she is having an allergic reaction to the Remeron because she skin is itching. She took a dose last night even though her skin itches. She is very anxious about taking medications that are new to her and also weaning off of the clonazepam ( even though it is no longer working for her).     I have reviewed the patient's medical history in detail and updated the computerized patient record.     The following portions of the patient's history were reviewed and updated as appropriate: allergies, current medications, past family history, past medical history, past social history, past surgical history and problem list.       Current Outpatient Medications:   •  calcium carbonate-vitamin d 600-400 MG-UNIT per tablet, Take 1 tablet by mouth Daily., Disp: , Rfl:   •  ESTRACE VAGINAL 0.1 MG/GM vaginal cream, Insert 1 g into the vagina 1 (One) Time Per Week., Disp: 42.5 g, Rfl: 12  •  KLONOPIN 1 MG tablet, TAKE ONE TABLET BY MOUTH AT BEDTIME. MAY TAKE A SECOND DOSE AS NEEDED, Disp: 45 tablet, Rfl: 0  •  mirtazapine (REMERON) 15 MG tablet, Take 7.5 mg by mouth every night at bedtime., Disp: , Rfl: 0  •  valACYclovir (VALTREX) 500 MG tablet, Take 1 tablet by mouth 2 (Two) Times a Day. (Patient taking differently: Take 500 mg by mouth 2 (Two) Times a Day As Needed.), Disp: 60 tablet, Rfl: 3    Review of Systems   Constitutional: Negative.    Psychiatric/Behavioral: Positive for sleep disturbance. The patient is nervous/anxious.         Vitals:    08/15/19 0959   BP: 120/78   BP Location: Left  "arm   Patient Position: Sitting   Cuff Size: Adult   Pulse: 64   Resp: 18   SpO2: 99%   Weight: 53.4 kg (117 lb 12.8 oz)   Height: 154.9 cm (61\")       Objective   Physical Exam   Constitutional: She is oriented to person, place, and time. She appears well-developed and well-nourished.   Neurological: She is alert and oriented to person, place, and time.   Skin: Skin is warm and dry.   Psychiatric: Her speech is normal and behavior is normal. Judgment and thought content normal. Her mood appears anxious. Cognition and memory are normal.   Vitals reviewed.        Assessment/Plan   Zehra was seen today for insomnia.    Diagnoses and all orders for this visit:    Insomnia, unspecified type    Other orders  -     Suvorexant 5 MG tablet; Take 1 tablet by mouth Every Night.    I have spent 55 minutes face to face with this patient of which the entire 55 minutes was spent discussing her anxiety and concerns about her medications, starting new medications and drug interactions.     Ms. Gong presents today because she is not sleeping. I saw her three weeks ago about this same subject. She had seen a psychiatrist in July and was told to decrease the amount of clonazepam she takes nightly to sleep and start taking Remeron. After out 7/2019 visit she started to take the Remeron on a nightly basis. She believes she is having an allergic reaction to the Remeron because she skin is itching. She took a dose last night even though her skin itches. She is very anxious about taking medications that are new to her and also weaning off of the clonazepam ( even though it is no longer working for her).     We have discussed that she needs to stop over thinking the \"what ifs\". We have developed a plan of care today. She is to continue with the 1/2 tab of clonazepam 1 mg as she has been doing for the past month. She has been given Belsomra 5 mg nightly to be taken 30 minutes before she takes the clonazepam 0.5 mg at bedtime. She has " been given 15 days of Belsomra until she can see the new psychiatrist she has an appointment with on 9/29/19.     We have discussed that she has to give this new plan a try for at least 5 day before she considers it as a failure. We also discussed that she does not need to go to the ER if this doesn't work for her, instead she needs to follow up with me.

## 2019-08-16 DIAGNOSIS — E55.9 VITAMIN D DEFICIENCY: ICD-10-CM

## 2019-08-16 DIAGNOSIS — E05.00 GRAVES DISEASE: Primary | ICD-10-CM

## 2019-08-16 DIAGNOSIS — Z79.899 ENCOUNTER FOR LONG-TERM (CURRENT) USE OF MEDICATIONS: ICD-10-CM

## 2019-08-18 ENCOUNTER — RESULTS ENCOUNTER (OUTPATIENT)
Dept: FAMILY MEDICINE CLINIC | Facility: CLINIC | Age: 74
End: 2019-08-18

## 2019-08-18 DIAGNOSIS — E05.00 GRAVES DISEASE: ICD-10-CM

## 2019-08-18 DIAGNOSIS — E55.9 VITAMIN D DEFICIENCY: ICD-10-CM

## 2019-08-18 DIAGNOSIS — Z79.899 ENCOUNTER FOR LONG-TERM (CURRENT) USE OF MEDICATIONS: ICD-10-CM

## 2019-08-22 LAB
25(OH)D3+25(OH)D2 SERPL-MCNC: 51.1 NG/ML (ref 30–100)
ALBUMIN SERPL-MCNC: 4.6 G/DL (ref 3.5–5.2)
ALBUMIN/GLOB SERPL: 2.1 G/DL
ALP SERPL-CCNC: 76 U/L (ref 39–117)
ALT SERPL-CCNC: 21 U/L (ref 1–33)
AST SERPL-CCNC: 20 U/L (ref 1–32)
BILIRUB SERPL-MCNC: 0.8 MG/DL (ref 0.2–1.2)
BUN SERPL-MCNC: 18 MG/DL (ref 8–23)
BUN/CREAT SERPL: 26.5 (ref 7–25)
CALCIUM SERPL-MCNC: 9.3 MG/DL (ref 8.6–10.5)
CHLORIDE SERPL-SCNC: 102 MMOL/L (ref 98–107)
CO2 SERPL-SCNC: 26.9 MMOL/L (ref 22–29)
CREAT SERPL-MCNC: 0.68 MG/DL (ref 0.57–1)
ERYTHROCYTE [DISTWIDTH] IN BLOOD BY AUTOMATED COUNT: 13.2 % (ref 12.3–15.4)
GLOBULIN SER CALC-MCNC: 2.2 GM/DL
GLUCOSE SERPL-MCNC: 89 MG/DL (ref 65–99)
HCT VFR BLD AUTO: 44.8 % (ref 34–46.6)
HGB BLD-MCNC: 14.1 G/DL (ref 12–15.9)
MCH RBC QN AUTO: 30.9 PG (ref 26.6–33)
MCHC RBC AUTO-ENTMCNC: 31.5 G/DL (ref 31.5–35.7)
MCV RBC AUTO: 98 FL (ref 79–97)
PLATELET # BLD AUTO: 179 10*3/MM3 (ref 140–450)
POTASSIUM SERPL-SCNC: 3.9 MMOL/L (ref 3.5–5.2)
PROT SERPL-MCNC: 6.8 G/DL (ref 6–8.5)
RBC # BLD AUTO: 4.57 10*6/MM3 (ref 3.77–5.28)
SODIUM SERPL-SCNC: 142 MMOL/L (ref 136–145)
TSH SERPL DL<=0.005 MIU/L-ACNC: 2.07 MIU/ML (ref 0.27–4.2)
WBC # BLD AUTO: 3.27 10*3/MM3 (ref 3.4–10.8)

## 2019-08-23 ENCOUNTER — OFFICE VISIT (OUTPATIENT)
Dept: FAMILY MEDICINE CLINIC | Facility: CLINIC | Age: 74
End: 2019-08-23

## 2019-08-23 VITALS
OXYGEN SATURATION: 98 % | RESPIRATION RATE: 18 BRPM | HEART RATE: 83 BPM | BODY MASS INDEX: 22.13 KG/M2 | DIASTOLIC BLOOD PRESSURE: 72 MMHG | HEIGHT: 61 IN | SYSTOLIC BLOOD PRESSURE: 120 MMHG | WEIGHT: 117.2 LBS

## 2019-08-23 DIAGNOSIS — F51.01 PRIMARY INSOMNIA: ICD-10-CM

## 2019-08-23 DIAGNOSIS — F41.9 ANXIETY: Primary | ICD-10-CM

## 2019-08-23 PROCEDURE — 99213 OFFICE O/P EST LOW 20 MIN: CPT | Performed by: NURSE PRACTITIONER

## 2019-09-10 ENCOUNTER — OFFICE VISIT (OUTPATIENT)
Dept: FAMILY MEDICINE CLINIC | Facility: CLINIC | Age: 74
End: 2019-09-10

## 2019-09-10 VITALS
BODY MASS INDEX: 22.01 KG/M2 | HEART RATE: 86 BPM | OXYGEN SATURATION: 99 % | WEIGHT: 116.6 LBS | DIASTOLIC BLOOD PRESSURE: 70 MMHG | TEMPERATURE: 98.3 F | SYSTOLIC BLOOD PRESSURE: 118 MMHG | HEIGHT: 61 IN | RESPIRATION RATE: 18 BRPM

## 2019-09-10 DIAGNOSIS — Z12.31 ENCOUNTER FOR SCREENING MAMMOGRAM FOR MALIGNANT NEOPLASM OF BREAST: ICD-10-CM

## 2019-09-10 DIAGNOSIS — N95.1 VAGINAL DRYNESS, MENOPAUSAL: ICD-10-CM

## 2019-09-10 DIAGNOSIS — Z80.3 FAMILY HISTORY OF BREAST CANCER IN MOTHER: Primary | ICD-10-CM

## 2019-09-10 PROCEDURE — 99214 OFFICE O/P EST MOD 30 MIN: CPT | Performed by: NURSE PRACTITIONER

## 2019-09-10 RX ORDER — ESTRADIOL 0.1 MG/G
1 CREAM VAGINAL WEEKLY
Qty: 42.5 G | Refills: 12 | Status: SHIPPED | OUTPATIENT
Start: 2019-09-10 | End: 2021-02-23

## 2019-09-10 RX ORDER — UREA 10 %
3 LOTION (ML) TOPICAL
COMMUNITY
End: 2019-11-06

## 2019-09-10 NOTE — PROGRESS NOTES
Subjective   Zehra Gong is a 73 y.o. female.     Chief Complaint   Patient presents with   • Anxiety     CSE     Ms. Gong presents today for her 3 month CSE to get refills on Clonazepam. She has seen Dr. Medrano, psychiatrist, whom is weaning Ms Gong off of clonazepam. She reports that Dr Medrano had told her that the length of time she has been on clonazepam is the reason for her worsening anxiety symptoms. She is to continue taking Belsomra as prescribed. She states the Belsomra is not working for her and that she is still waking up during the night with panic attacks.      I have reviewed the patient's medical history in detail and updated the computerized patient record.     The following portions of the patient's history were reviewed and updated as appropriate: allergies, current medications, past family history, past medical history, past social history, past surgical history and problem list.       Current Outpatient Medications:   •  calcium carbonate-vitamin d 600-400 MG-UNIT per tablet, Take 1 tablet by mouth Daily., Disp: , Rfl:   •  ESTRACE VAGINAL 0.1 MG/GM vaginal cream, Insert 1 g into the vagina 1 (One) Time Per Week., Disp: 42.5 g, Rfl: 12  •  KLONOPIN 1 MG tablet, TAKE ONE TABLET BY MOUTH AT BEDTIME. MAY TAKE A SECOND DOSE AS NEEDED, Disp: 45 tablet, Rfl: 0  •  Suvorexant 5 MG tablet, Take 1 tablet by mouth Every Night., Disp: 15 tablet, Rfl: 0  •  valACYclovir (VALTREX) 500 MG tablet, Take 1 tablet by mouth 2 (Two) Times a Day. (Patient taking differently: Take 500 mg by mouth 2 (Two) Times a Day As Needed.), Disp: 60 tablet, Rfl: 3    Review of Systems   Respiratory: Negative.    Cardiovascular: Negative.    Neurological: Positive for memory problem.   Psychiatric/Behavioral: Positive for agitation, decreased concentration and sleep disturbance. The patient is nervous/anxious.         Vitals:    09/10/19 1352   BP: 118/70   BP Location: Right arm   Patient Position: Sitting   Cuff  "Size: Adult   Pulse: 86   Resp: 18   Temp: 98.3 °F (36.8 °C)   TempSrc: Oral   SpO2: 99%   Weight: 52.9 kg (116 lb 9.6 oz)   Height: 154.9 cm (61\")       Objective   Physical Exam   Constitutional: She is oriented to person, place, and time. She appears well-developed and well-nourished.   Neurological: She is alert and oriented to person, place, and time.   Skin: Skin is warm and dry.   Psychiatric: Thought content normal. Her mood appears anxious. Her speech is rapid and/or pressured. She is hyperactive. Cognition and memory are impaired. She exhibits a depressed mood.   Vitals reviewed.        Assessment/Plan   Zehra was seen today for anxiety.    Diagnoses and all orders for this visit:    Family history of breast cancer in mother  -     Mammo Screening Bilateral With CAD    Encounter for screening mammogram for malignant neoplasm of breast   -     Mammo Screening Bilateral With CAD    Vaginal dryness, menopausal  -     ESTRACE VAGINAL 0.1 MG/GM vaginal cream; Insert 1 g into the vagina 1 (One) Time Per Week.      Ms. Gong presents today for her three month CSE to get refills on Clonazepam. I will not be refilling the Clonazepam today since she is now seeing Dr. Medrano and he is weaning her off of the medication. She is to continue with him as scheduled and get her medication for anxiety/insomnia through Dr. Vicente.     She reports she needs her annual mammogram done this month so I have placed an order for that. She also needs refills on Estarace vaginal cream 0.1 mg/gm which has been ordered today.     She is to follow up as needed.         "

## 2019-09-23 ENCOUNTER — OFFICE VISIT (OUTPATIENT)
Dept: FAMILY MEDICINE CLINIC | Facility: CLINIC | Age: 74
End: 2019-09-23

## 2019-09-23 VITALS
RESPIRATION RATE: 18 BRPM | DIASTOLIC BLOOD PRESSURE: 64 MMHG | SYSTOLIC BLOOD PRESSURE: 116 MMHG | WEIGHT: 119.2 LBS | OXYGEN SATURATION: 99 % | HEIGHT: 61 IN | TEMPERATURE: 98.6 F | BODY MASS INDEX: 22.51 KG/M2 | HEART RATE: 68 BPM

## 2019-09-23 DIAGNOSIS — A60.09 HERPES GENITALIS IN WOMEN: Primary | ICD-10-CM

## 2019-09-23 DIAGNOSIS — F41.9 ANXIETY: ICD-10-CM

## 2019-09-23 PROCEDURE — 99214 OFFICE O/P EST MOD 30 MIN: CPT | Performed by: NURSE PRACTITIONER

## 2019-09-23 RX ORDER — TEMAZEPAM 15 MG/1
15 CAPSULE ORAL NIGHTLY PRN
COMMUNITY
End: 2019-11-06

## 2019-09-23 RX ORDER — GABAPENTIN 100 MG/1
100 CAPSULE ORAL 3 TIMES DAILY
COMMUNITY
End: 2019-11-06

## 2019-11-06 ENCOUNTER — OFFICE VISIT (OUTPATIENT)
Dept: FAMILY MEDICINE CLINIC | Facility: CLINIC | Age: 74
End: 2019-11-06

## 2019-11-06 VITALS
TEMPERATURE: 98.1 F | HEIGHT: 61 IN | WEIGHT: 120.7 LBS | DIASTOLIC BLOOD PRESSURE: 78 MMHG | BODY MASS INDEX: 22.79 KG/M2 | OXYGEN SATURATION: 99 % | SYSTOLIC BLOOD PRESSURE: 116 MMHG | RESPIRATION RATE: 18 BRPM | HEART RATE: 68 BPM

## 2019-11-06 DIAGNOSIS — L72.3 SEBACEOUS CYST: Primary | ICD-10-CM

## 2019-11-06 PROCEDURE — 99213 OFFICE O/P EST LOW 20 MIN: CPT | Performed by: NURSE PRACTITIONER

## 2019-11-06 RX ORDER — LORAZEPAM 0.5 MG/1
0.5 TABLET ORAL EVERY 8 HOURS PRN
COMMUNITY
End: 2020-05-06 | Stop reason: ALTCHOICE

## 2019-11-06 RX ORDER — RISPERIDONE 0.5 MG/1
0.5 TABLET ORAL 2 TIMES DAILY
COMMUNITY
End: 2020-05-06 | Stop reason: ALTCHOICE

## 2019-11-06 RX ORDER — VALACYCLOVIR HYDROCHLORIDE 500 MG/1
1000 TABLET, FILM COATED ORAL DAILY
Qty: 60 TABLET | Refills: 2 | Status: SHIPPED | OUTPATIENT
Start: 2019-11-06 | End: 2021-07-27

## 2019-11-06 RX ORDER — DOXEPIN HYDROCHLORIDE 50 MG/1
25 CAPSULE ORAL NIGHTLY
COMMUNITY
End: 2020-05-06 | Stop reason: ALTCHOICE

## 2019-11-06 RX ORDER — MUPIROCIN CALCIUM 20 MG/G
CREAM TOPICAL 3 TIMES DAILY
Qty: 15 G | Refills: 3 | Status: SHIPPED | OUTPATIENT
Start: 2019-11-06 | End: 2020-05-06

## 2019-11-06 RX ORDER — DULOXETIN HYDROCHLORIDE 20 MG/1
20 CAPSULE, DELAYED RELEASE ORAL DAILY
COMMUNITY
End: 2021-03-30 | Stop reason: SDUPTHER

## 2019-11-06 RX ORDER — HYDROXYZINE HYDROCHLORIDE 25 MG/1
25 TABLET, FILM COATED ORAL 3 TIMES DAILY PRN
COMMUNITY
End: 2020-05-06 | Stop reason: ALTCHOICE

## 2019-12-09 ENCOUNTER — TELEPHONE (OUTPATIENT)
Dept: FAMILY MEDICINE CLINIC | Facility: CLINIC | Age: 74
End: 2019-12-09

## 2019-12-09 NOTE — TELEPHONE ENCOUNTER
Explained to partient that since she is seeing a sleep specialist we cant prescribe her sleeping medications. She explained she has taken herself off some of the medications. I explained the risks of doing so.

## 2019-12-12 ENCOUNTER — OFFICE VISIT (OUTPATIENT)
Dept: FAMILY MEDICINE CLINIC | Facility: CLINIC | Age: 74
End: 2019-12-12

## 2019-12-12 VITALS
DIASTOLIC BLOOD PRESSURE: 76 MMHG | HEIGHT: 61 IN | OXYGEN SATURATION: 98 % | TEMPERATURE: 98 F | RESPIRATION RATE: 18 BRPM | SYSTOLIC BLOOD PRESSURE: 118 MMHG | HEART RATE: 64 BPM | BODY MASS INDEX: 22.62 KG/M2 | WEIGHT: 119.8 LBS

## 2019-12-12 DIAGNOSIS — Z00.00 MEDICARE ANNUAL WELLNESS VISIT, SUBSEQUENT: Primary | ICD-10-CM

## 2019-12-12 DIAGNOSIS — E78.2 MIXED HYPERLIPIDEMIA: ICD-10-CM

## 2019-12-12 PROCEDURE — G0439 PPPS, SUBSEQ VISIT: HCPCS | Performed by: NURSE PRACTITIONER

## 2019-12-12 NOTE — PROGRESS NOTES
The ABCs of the Annual Wellness Visit  Subsequent Medicare Wellness Visit    Chief Complaint   Patient presents with   • Medicare Wellness-subsequent       Subjective   History of Present Illness:  Zehra Gong is a 74 y.o. female who presents for a Subsequent Medicare Wellness Visit.    HEALTH RISK ASSESSMENT    Recent Hospitalizations:  Recently treated at the following:  Other: [facility] and [facility]    Current Medical Providers:  Patient Care Team:  Maru Kim APRN as PCP - General (Family Medicine)  Panda Rinaldi MD as Consulting Physician (Rheumatology)  Ewelina Childers MD as Consulting Physician (Obstetrics and Gynecology)  Georgette Winkler MD as Consulting Physician (Endocrinology)  George Sawyer MD as Consulting Physician (Hematology and Oncology)    Smoking Status:  Social History     Tobacco Use   Smoking Status Never Smoker   Smokeless Tobacco Never Used       Alcohol Consumption:  Social History     Substance and Sexual Activity   Alcohol Use No       Depression Screen:   PHQ-2/PHQ-9 Depression Screening 12/12/2019   Little interest or pleasure in doing things 0   Feeling down, depressed, or hopeless 0   Total Score 0       Fall Risk Screen:  STEADI Fall Risk Assessment was completed, and patient is at LOW risk for falls.Assessment completed on:12/12/2019    Health Habits and Functional and Cognitive Screening:  Functional & Cognitive Status 12/12/2019   Do you have difficulty preparing food and eating? No   Do you have difficulty bathing yourself, getting dressed or grooming yourself? No   Do you have difficulty using the toilet? No   Do you have difficulty moving around from place to place? No   Do you have trouble with steps or getting out of a bed or a chair? No   Current Diet Well Balanced Diet   Dental Exam Up to date   Eye Exam Up to date   Exercise (times per week) 0 times per week   Current Exercise Activities Include None   Do you need help using the phone?   No   Are you deaf or do you have serious difficulty hearing?  No   Do you need help with transportation? No   Do you need help shopping? No   Do you need help preparing meals?  No   Do you need help with housework?  No   Do you need help with laundry? No   Do you need help taking your medications? No   Do you need help managing money? No   Do you ever drive or ride in a car without wearing a seat belt? No   Have you felt unusual stress, anger or loneliness in the last month? No   Who do you live with? Spouse   If you need help, do you have trouble finding someone available to you? No   Have you been bothered in the last four weeks by sexual problems? No   Do you have difficulty concentrating, remembering or making decisions? No         Does the patient have evidence of cognitive impairment? No    Asprin use counseling:Does not need ASA (and currently is not on it)    Age-appropriate Screening Schedule:  Refer to the list below for future screening recommendations based on patient's age, sex and/or medical conditions. Orders for these recommended tests are listed in the plan section. The patient has been provided with a written plan.    Health Maintenance   Topic Date Due   • TDAP/TD VACCINES (1 - Tdap) 12/02/1956   • ZOSTER VACCINE (2 of 2) 12/18/2018   • LIPID PANEL  05/21/2019   • PNEUMOCOCCAL VACCINES (65+ LOW/MEDIUM RISK) (2 of 2 - PPSV23) 10/16/2019   • DXA SCAN  12/12/2019   • MAMMOGRAM  11/20/2021   • COLONOSCOPY  04/08/2025   • INFLUENZA VACCINE  Addressed          The following portions of the patient's history were reviewed and updated as appropriate: allergies, current medications, past family history, past medical history, past social history, past surgical history and problem list.    Outpatient Medications Prior to Visit   Medication Sig Dispense Refill   • calcium carbonate-vitamin d 600-400 MG-UNIT per tablet Take 1 tablet by mouth Daily.     • doxepin (SINEquan) 50 MG capsule Take 25 mg by mouth  Every Night.     • DULoxetine (CYMBALTA) 20 MG capsule Take 20 mg by mouth Daily.     • ESTRACE VAGINAL 0.1 MG/GM vaginal cream Insert 1 g into the vagina 1 (One) Time Per Week. 42.5 g 12   • hydrOXYzine (ATARAX) 25 MG tablet Take 25 mg by mouth 3 (Three) Times a Day As Needed for Itching.     • LORazepam (ATIVAN) 0.5 MG tablet Take 0.5 mg by mouth Every 8 (Eight) Hours As Needed for Anxiety.     • valACYclovir (VALTREX) 500 MG tablet Take 2 tablets by mouth Daily. 60 tablet 2   • mupirocin (BACTROBAN) 2 % cream Apply  topically to the appropriate area as directed 3 (Three) Times a Day. 15 g 3   • risperiDONE (risperDAL) 0.5 MG tablet Take 0.5 mg by mouth 2 (Two) Times a Day.       No facility-administered medications prior to visit.        Patient Active Problem List   Diagnosis   • Insomnia   • Anxiety       Advanced Care Planning:  Patient has an advance directive - a copy has been provided and is visible in patient header    Review of Systems   Constitutional: Negative.    Respiratory: Negative.    Cardiovascular: Negative.    Gastrointestinal: Negative.    Genitourinary: Negative.    Skin: Negative.    Neurological: Negative.    Psychiatric/Behavioral: Positive for decreased concentration and sleep disturbance (improving, working with sleep specialist to get her off of all sleep aids). The patient is nervous/anxious (improving on medications).         Depression       Compared to one year ago, the patient feels her physical health is the same.  Compared to one year ago, the patient feels her mental health is the same.    Reviewed chart for potential of high risk medication in the elderly: yes  Reviewed chart for potential of harmful drug interactions in the elderly:yes    Objective         Vitals:    12/12/19 1032   BP: 118/76   BP Location: Left arm   Patient Position: Sitting   Cuff Size: Adult   Pulse: 64   Resp: 18   Temp: 98 °F (36.7 °C)   TempSrc: Oral   SpO2: 98%   Weight: 54.3 kg (119 lb 12.8 oz)  "  Height: 154.9 cm (61\")       Body mass index is 22.64 kg/m².  Discussed the patient's BMI with her. The BMI is in the acceptable range.    Physical Exam   Constitutional: She is oriented to person, place, and time. She appears well-developed and well-nourished.   Neurological: She is alert and oriented to person, place, and time.   Skin: Skin is warm and dry.   Psychiatric:   Her affect is brighter, she is still obsessed with her medications and wants to stop some to the antidepressants   Vitals reviewed.      Lab Results   Component Value Date    GLU 92 10/25/2019        Assessment/Plan   Medicare Risks and Personalized Health Plan  CMS Preventative Services Quick Reference  Immunizations Discussed/Encouraged (specific immunizations; Pneumococcal 23 )  Osteoprorosis Risk  has a cold does not want her pneumonia or influenza vaccines today    The above risks/problems have been discussed with the patient.  Pertinent information has been shared with the patient in the After Visit Summary.  Follow up plans and orders are seen below in the Assessment/Plan Section.    Diagnoses and all orders for this visit:    1. Medicare annual wellness visit, subsequent (Primary)      Follow Up:  No follow-ups on file.     An After Visit Summary and PPPS were given to the patient.       She is to return when she is fasting to get her lipid levels drawn and she is to call me and let me know where she wants her DEXA scan done.       "

## 2019-12-12 NOTE — PATIENT INSTRUCTIONS
Medicare Wellness  Personal Prevention Plan of Service     Date of Office Visit:  2019  Encounter Provider:  THIEN Salgado  Place of Service:  [facility]  Patient Name: Zehra Gong  :  1945    As part of the Medicare Wellness portion of your visit today, we are providing you with this personalized preventive plan of services (PPPS). This plan is based upon recommendations of the [place] Preventive Services Task Force (USPSTF) and the Advisory Committee on Immunization Practices (ACIP).    This lists the preventive care services that should be considered, and provides dates of when you are due. Items listed as completed are up-to-date and do not require any further intervention.    Health Maintenance   Topic Date Due   • TDAP/TD VACCINES (1 - Tdap) 1956   • ZOSTER VACCINE (2 of 2) 2018   • LIPID PANEL  2019   • PNEUMOCOCCAL VACCINES (65+ LOW/MEDIUM RISK) (2 of 2 - PPSV23) 10/16/2019   • MEDICARE ANNUAL WELLNESS  2019   • DXA SCAN  2019   • MAMMOGRAM  2021   • COLONOSCOPY  2025   • INFLUENZA VACCINE  Addressed       No orders of the defined types were placed in this encounter.      No follow-ups on file.

## 2019-12-13 DIAGNOSIS — M81.0 AGE RELATED OSTEOPOROSIS, UNSPECIFIED PATHOLOGICAL FRACTURE PRESENCE: Primary | ICD-10-CM

## 2019-12-13 LAB
CHOLEST SERPL-MCNC: 194 MG/DL (ref 0–200)
CHOLEST/HDLC SERPL: 2.02 {RATIO}
HDLC SERPL-MCNC: 96 MG/DL (ref 40–60)
LDLC SERPL CALC-MCNC: 90 MG/DL (ref 0–100)
TRIGL SERPL-MCNC: 40 MG/DL (ref 0–150)
VLDLC SERPL CALC-MCNC: 8 MG/DL

## 2020-01-07 ENCOUNTER — OFFICE VISIT (OUTPATIENT)
Dept: FAMILY MEDICINE CLINIC | Facility: CLINIC | Age: 75
End: 2020-01-07

## 2020-01-07 VITALS
TEMPERATURE: 98.2 F | WEIGHT: 119.6 LBS | DIASTOLIC BLOOD PRESSURE: 60 MMHG | OXYGEN SATURATION: 98 % | SYSTOLIC BLOOD PRESSURE: 102 MMHG | RESPIRATION RATE: 18 BRPM | BODY MASS INDEX: 22.58 KG/M2 | HEIGHT: 61 IN | HEART RATE: 76 BPM

## 2020-01-07 DIAGNOSIS — R39.9 UTI SYMPTOMS: Primary | ICD-10-CM

## 2020-01-07 LAB
BILIRUB BLD-MCNC: NEGATIVE MG/DL
CLARITY, POC: CLEAR
COLOR UR: YELLOW
GLUCOSE UR STRIP-MCNC: NEGATIVE MG/DL
KETONES UR QL: NEGATIVE
LEUKOCYTE EST, POC: NEGATIVE
NITRITE UR-MCNC: NEGATIVE MG/ML
PH UR: 5.5 [PH] (ref 5–8)
PROT UR STRIP-MCNC: NEGATIVE MG/DL
RBC # UR STRIP: ABNORMAL /UL
SP GR UR: 1.03 (ref 1–1.03)
UROBILINOGEN UR QL: NORMAL

## 2020-01-07 PROCEDURE — 99213 OFFICE O/P EST LOW 20 MIN: CPT | Performed by: NURSE PRACTITIONER

## 2020-01-07 PROCEDURE — 81003 URINALYSIS AUTO W/O SCOPE: CPT | Performed by: NURSE PRACTITIONER

## 2020-01-07 NOTE — PROGRESS NOTES
Subjective   Zehra Gong is a 74 y.o. female.     Chief Complaint   Patient presents with   • Urinary Tract Infection     Sx     Ms. Gong presents today with concerns that she may have a UTI. She reports she has urinary frequency, urgency and an odor for the past 3 weeks. She denies fever, chills, painful urination, flank pain or pelvic pressure.        I have reviewed the patient's medical history in detail and updated the computerized patient record.    The following portions of the patient's history were reviewed and updated as appropriate: allergies, current medications, past family history, past medical history, past social history, past surgical history and problem list.      Current Outpatient Medications:   •  calcium carbonate-vitamin d 600-400 MG-UNIT per tablet, Take 1 tablet by mouth Daily., Disp: , Rfl:   •  doxepin (SINEquan) 50 MG capsule, Take 25 mg by mouth Every Night., Disp: , Rfl:   •  DULoxetine (CYMBALTA) 20 MG capsule, Take 20 mg by mouth Daily., Disp: , Rfl:   •  ESTRACE VAGINAL 0.1 MG/GM vaginal cream, Insert 1 g into the vagina 1 (One) Time Per Week., Disp: 42.5 g, Rfl: 12  •  hydrOXYzine (ATARAX) 25 MG tablet, Take 25 mg by mouth 3 (Three) Times a Day As Needed for Itching., Disp: , Rfl:   •  LORazepam (ATIVAN) 0.5 MG tablet, Take 0.5 mg by mouth Every 8 (Eight) Hours As Needed for Anxiety., Disp: , Rfl:   •  valACYclovir (VALTREX) 500 MG tablet, Take 2 tablets by mouth Daily., Disp: 60 tablet, Rfl: 2  •  mupirocin (BACTROBAN) 2 % cream, Apply  topically to the appropriate area as directed 3 (Three) Times a Day., Disp: 15 g, Rfl: 3  •  risperiDONE (risperDAL) 0.5 MG tablet, Take 0.5 mg by mouth 2 (Two) Times a Day., Disp: , Rfl:     Review of Systems   Constitutional: Negative.  Negative for chills and fever.   Genitourinary: Positive for frequency and urgency. Negative for difficulty urinating, dysuria, pelvic pressure and urinary incontinence.   Musculoskeletal: Negative for  arthralgias and myalgias.   Neurological: Negative for confusion.        Vitals:    01/07/20 1150   BP: 102/60   Pulse: 76   Resp: 18   Temp: 98.2 °F (36.8 °C)   SpO2: 98%       Objective   Physical Exam   Constitutional: She is oriented to person, place, and time. She appears well-developed and well-nourished.   Cardiovascular: Normal rate, regular rhythm, normal heart sounds and intact distal pulses.   Pulmonary/Chest: Effort normal and breath sounds normal.   Neurological: She is alert and oriented to person, place, and time.   Skin: Skin is warm and dry.   Psychiatric:   No acute distress   Vitals reviewed.        Assessment/Plan   Zehra was seen today for urinary tract infection.    Diagnoses and all orders for this visit:    UTI symptoms  -     POC Urinalysis Dipstick, Automated  -     Urine Culture - Urine, Urine, Clean Catch  -     Urinalysis With Microscopic - Urine, Clean Catch      Ms. Gong presents today with UTI symptoms. Her PCOT urine is negative for a UTI. I will send her urine out for culture and do a microscopic urine.   She has been encouraged to stay well hydrated.   She is to follow up as needed and at her next scheduled appointment.

## 2020-01-08 LAB
APPEARANCE UR: CLEAR
BACTERIA #/AREA URNS HPF: NORMAL /HPF
BACTERIA UR CULT: NO GROWTH
BACTERIA UR CULT: NORMAL
BILIRUB UR QL STRIP: NEGATIVE
COLOR UR: YELLOW
CRYSTALS URNS MICRO: NORMAL
EPI CELLS #/AREA URNS HPF: NORMAL /HPF
GLUCOSE UR QL: NEGATIVE
HGB UR QL STRIP: ABNORMAL
KETONES UR QL STRIP: NEGATIVE
LEUKOCYTE ESTERASE UR QL STRIP: NEGATIVE
NITRITE UR QL STRIP: NEGATIVE
PH UR STRIP: 5.5 [PH] (ref 5–8)
PROT UR QL STRIP: NEGATIVE
RBC #/AREA URNS HPF: NORMAL /HPF
SP GR UR: 1.02 (ref 1–1.03)
UROBILINOGEN UR STRIP-MCNC: ABNORMAL MG/DL
WBC #/AREA URNS HPF: NORMAL /HPF

## 2020-02-03 ENCOUNTER — TELEPHONE (OUTPATIENT)
Dept: FAMILY MEDICINE CLINIC | Facility: CLINIC | Age: 75
End: 2020-02-03

## 2020-02-03 NOTE — TELEPHONE ENCOUNTER
PATIENT CALLED AND ASKED FOR RECENT BONE DENSITY RESULTS.  PER MATEUS MATIAS, PATIENT HAD VERY MINIMAL CHANGE IN FRACTURE RISK AND TO FOLLOW UP IN 2 YEARS.  PATIENT VOICED UNDERSTANDING.

## 2020-05-06 ENCOUNTER — TELEPHONE (OUTPATIENT)
Dept: FAMILY MEDICINE CLINIC | Facility: CLINIC | Age: 75
End: 2020-05-06

## 2020-05-06 RX ORDER — DOXEPIN HYDROCHLORIDE 10 MG/1
10 CAPSULE ORAL NIGHTLY
Start: 2020-05-06 | End: 2021-03-30 | Stop reason: SDUPTHER

## 2020-05-06 NOTE — TELEPHONE ENCOUNTER
PATIENT STATES THAT SHE WOULD LIKE TO GET OFF DULoxetine (CYMBALTA) 20 MG capsule AND SHE WANTS TO KNOW THE BEST WAY TO GET OFF OF IT.    PLEASE ADVISE

## 2020-05-06 NOTE — TELEPHONE ENCOUNTER
She sees Dr Gutierrez That prescribes this for her. She is concerned for her B/P is elevating.     She stated approx one year ago he told her that she could just stop it without tapering.     I advised that it is very important that she reach out to her Prescriber.     I offered an appointment to discuss her b/p that is averaging 135/140 systolic and 75 diastolic.     She declined, but is aware that we are available. I asked her to continue to monitor her b/p and call with any concerns.

## 2020-09-01 ENCOUNTER — TELEPHONE (OUTPATIENT)
Dept: FAMILY MEDICINE CLINIC | Facility: CLINIC | Age: 75
End: 2020-09-01

## 2020-09-01 DIAGNOSIS — Z80.3 FAMILY HISTORY OF BREAST CANCER IN MOTHER: ICD-10-CM

## 2020-09-01 DIAGNOSIS — Z12.31 ENCOUNTER FOR SCREENING MAMMOGRAM FOR MALIGNANT NEOPLASM OF BREAST: Primary | ICD-10-CM

## 2020-09-01 NOTE — TELEPHONE ENCOUNTER
Pt needs to get authorization for mammogram at /[facility].  Would like to PU if possible.  Call her at [phone] Thanks, AJ

## 2020-09-02 DIAGNOSIS — Z12.39 SCREENING FOR BREAST CANCER: Primary | ICD-10-CM

## 2020-09-02 DIAGNOSIS — Z12.31 ENCOUNTER FOR SCREENING MAMMOGRAM FOR MALIGNANT NEOPLASM OF BREAST: ICD-10-CM

## 2020-11-30 DIAGNOSIS — Z11.59 ENCOUNTER FOR HEPATITIS C SCREENING TEST FOR LOW RISK PATIENT: ICD-10-CM

## 2020-11-30 DIAGNOSIS — E55.9 VITAMIN D DEFICIENCY: ICD-10-CM

## 2020-11-30 DIAGNOSIS — E05.00 GRAVES DISEASE: ICD-10-CM

## 2020-11-30 DIAGNOSIS — E78.2 MIXED HYPERLIPIDEMIA: Primary | ICD-10-CM

## 2020-12-06 ENCOUNTER — RESULTS ENCOUNTER (OUTPATIENT)
Dept: FAMILY MEDICINE CLINIC | Facility: CLINIC | Age: 75
End: 2020-12-06

## 2020-12-06 DIAGNOSIS — E05.00 GRAVES DISEASE: ICD-10-CM

## 2020-12-06 DIAGNOSIS — E55.9 VITAMIN D DEFICIENCY: ICD-10-CM

## 2020-12-06 DIAGNOSIS — Z11.59 ENCOUNTER FOR HEPATITIS C SCREENING TEST FOR LOW RISK PATIENT: ICD-10-CM

## 2020-12-06 DIAGNOSIS — E78.2 MIXED HYPERLIPIDEMIA: ICD-10-CM

## 2020-12-08 LAB
25(OH)D3+25(OH)D2 SERPL-MCNC: 40.4 NG/ML (ref 30–100)
ALBUMIN SERPL-MCNC: 4.4 G/DL (ref 3.5–5.2)
ALBUMIN/GLOB SERPL: 2.1 G/DL
ALP SERPL-CCNC: 73 U/L (ref 39–117)
ALT SERPL-CCNC: 18 U/L (ref 1–33)
AST SERPL-CCNC: 16 U/L (ref 1–32)
BILIRUB SERPL-MCNC: 0.3 MG/DL (ref 0–1.2)
BUN SERPL-MCNC: 18 MG/DL (ref 8–23)
BUN/CREAT SERPL: 24.3 (ref 7–25)
CALCIUM SERPL-MCNC: 9.2 MG/DL (ref 8.6–10.5)
CHLORIDE SERPL-SCNC: 104 MMOL/L (ref 98–107)
CHOLEST SERPL-MCNC: 185 MG/DL (ref 0–200)
CO2 SERPL-SCNC: 27.4 MMOL/L (ref 22–29)
CREAT SERPL-MCNC: 0.74 MG/DL (ref 0.57–1)
GLOBULIN SER CALC-MCNC: 2.1 GM/DL
GLUCOSE SERPL-MCNC: 79 MG/DL (ref 65–99)
HCV AB S/CO SERPL IA: <0.1 S/CO RATIO (ref 0–0.9)
HDLC SERPL-MCNC: 98 MG/DL (ref 40–60)
LDLC SERPL CALC-MCNC: 79 MG/DL (ref 0–100)
POTASSIUM SERPL-SCNC: 4.4 MMOL/L (ref 3.5–5.2)
PROT SERPL-MCNC: 6.5 G/DL (ref 6–8.5)
SODIUM SERPL-SCNC: 141 MMOL/L (ref 136–145)
TRIGL SERPL-MCNC: 35 MG/DL (ref 0–150)
TSH SERPL DL<=0.005 MIU/L-ACNC: 2.23 UIU/ML (ref 0.27–4.2)
VLDLC SERPL CALC-MCNC: 8 MG/DL (ref 5–40)

## 2020-12-14 ENCOUNTER — OFFICE VISIT (OUTPATIENT)
Dept: FAMILY MEDICINE CLINIC | Facility: CLINIC | Age: 75
End: 2020-12-14

## 2020-12-14 VITALS
OXYGEN SATURATION: 98 % | DIASTOLIC BLOOD PRESSURE: 72 MMHG | WEIGHT: 120 LBS | TEMPERATURE: 97.1 F | SYSTOLIC BLOOD PRESSURE: 102 MMHG | HEART RATE: 72 BPM | HEIGHT: 61 IN | BODY MASS INDEX: 22.66 KG/M2

## 2020-12-14 DIAGNOSIS — Z00.00 ENCOUNTER FOR SUBSEQUENT ANNUAL WELLNESS VISIT (AWV) IN MEDICARE PATIENT: Primary | ICD-10-CM

## 2020-12-14 PROCEDURE — G0439 PPPS, SUBSEQ VISIT: HCPCS | Performed by: NURSE PRACTITIONER

## 2020-12-14 NOTE — PATIENT INSTRUCTIONS
Medicare Wellness  Personal Prevention Plan of Service     Date of Office Visit:  2020  Encounter Provider:  THIEN Salgado  Place of Service:  [facility]INE  Patient Name: Zehra Gong  :  1945    As part of the Medicare Wellness portion of your visit today, we are providing you with this personalized preventive plan of services (PPPS). This plan is based upon recommendations of the [place] Preventive Services Task Force (USPSTF) and the Advisory Committee on Immunization Practices (ACIP).    This lists the preventive care services that should be considered, and provides dates of when you are due. Items listed as completed are up-to-date and do not require any further intervention.    Health Maintenance   Topic Date Due   • TDAP/TD VACCINES (1 - Tdap) 1964   • Pneumococcal Vaccine 65+ (2 of 2 - PPSV23) 10/16/2019   • ANNUAL WELLNESS VISIT  2020   • MAMMOGRAM  2021   • LIPID PANEL  2021   • DXA SCAN  01/15/2022   • COLONOSCOPY  2025   • HEPATITIS C SCREENING  Completed   • INFLUENZA VACCINE  Completed   • ZOSTER VACCINE  Completed       No orders of the defined types were placed in this encounter.      No follow-ups on file.

## 2020-12-14 NOTE — PROGRESS NOTES
The ABCs of the Annual Wellness Visit  Subsequent Medicare Wellness Visit    Chief Complaint   Patient presents with   • Medicare Wellness-subsequent       Subjective   History of Present Illness:  Zehra Gong is a 75 y.o. female who presents for a Subsequent Medicare Wellness Visit.    HEALTH RISK ASSESSMENT    Recent Hospitalizations:  No hospitalization(s) within the last year.    Current Medical Providers:  Patient Care Team:  Maru Kim APRN as PCP - General (Family Medicine)  Panda Rinaldi MD as Consulting Physician (Rheumatology)  Ewelina Childers MD as Consulting Physician (Obstetrics and Gynecology)  Georgette Winkler MD as Consulting Physician (Endocrinology)  George Sawyer MD as Consulting Physician (Hematology and Oncology)    Smoking Status:  Social History     Tobacco Use   Smoking Status Never Smoker   Smokeless Tobacco Never Used       Alcohol Consumption:  Social History     Substance and Sexual Activity   Alcohol Use No       Depression Screen:   PHQ-2/PHQ-9 Depression Screening 12/14/2020   Little interest or pleasure in doing things 0   Feeling down, depressed, or hopeless 0   Total Score 0       Fall Risk Screen:  PATRICKADI Fall Risk Assessment was completed, and patient is at LOW risk for falls.Assessment completed on:12/14/2020    Health Habits and Functional and Cognitive Screening:  Functional & Cognitive Status 12/14/2020   Do you have difficulty preparing food and eating? No   Do you have difficulty bathing yourself, getting dressed or grooming yourself? No   Do you have difficulty using the toilet? No   Do you have difficulty moving around from place to place? No   Do you have trouble with steps or getting out of a bed or a chair? No   Current Diet Well Balanced Diet   Dental Exam Up to date   Eye Exam Up to date   Exercise (times per week) 7 times per week   Current Exercise Activities Include Light Weight/Kettebells   Do you need help using the phone?  No   Are you deaf  or do you have serious difficulty hearing?  No   Do you need help with transportation? No   Do you need help shopping? No   Do you need help preparing meals?  No   Do you need help with housework?  No   Do you need help with laundry? No   Do you need help taking your medications? No   Do you need help managing money? No   Do you ever drive or ride in a car without wearing a seat belt? No   Have you felt unusual stress, anger or loneliness in the last month? No   Who do you live with? Spouse   If you need help, do you have trouble finding someone available to you? No   Have you been bothered in the last four weeks by sexual problems? No   Do you have difficulty concentrating, remembering or making decisions? No         Does the patient have evidence of cognitive impairment? No    Asprin use counseling:Does not need ASA (and currently is not on it)    Age-appropriate Screening Schedule:  Refer to the list below for future screening recommendations based on patient's age, sex and/or medical conditions. Orders for these recommended tests are listed in the plan section. The patient has been provided with a written plan.    Health Maintenance   Topic Date Due   • TDAP/TD VACCINES (1 - Tdap) 12/02/1964   • MAMMOGRAM  11/20/2021   • LIPID PANEL  12/07/2021   • DXA SCAN  01/15/2022   • COLONOSCOPY  04/08/2025   • INFLUENZA VACCINE  Completed   • ZOSTER VACCINE  Completed          The following portions of the patient's history were reviewed and updated as appropriate: allergies, current medications, past family history, past medical history, past social history, past surgical history and problem list.    Outpatient Medications Prior to Visit   Medication Sig Dispense Refill   • calcium carbonate-vitamin d 600-400 MG-UNIT per tablet Take 1 tablet by mouth Daily.     • doxepin (SINEquan) 10 MG capsule Take 1 capsule by mouth Every Night.     • DULoxetine (CYMBALTA) 20 MG capsule Take 20 mg by mouth Daily.     • ESTRACE VAGINAL  "0.1 MG/GM vaginal cream Insert 1 g into the vagina 1 (One) Time Per Week. 42.5 g 12   • valACYclovir (VALTREX) 500 MG tablet Take 2 tablets by mouth Daily. 60 tablet 2     No facility-administered medications prior to visit.        Patient Active Problem List   Diagnosis   • Insomnia   • Anxiety       Advanced Care Planning:  ACP discussion was held with the patient during this visit. Patient has an advance directive in EMR which is still valid.     Review of Systems   Constitutional: Negative.    HENT: Negative.    Eyes:        Has cataracts in both eyes   Respiratory: Negative.    Cardiovascular: Negative.    Gastrointestinal: Negative.    Genitourinary: Negative.    Musculoskeletal: Negative.    Skin: Negative.    Neurological: Negative.    Psychiatric/Behavioral: Negative.        Compared to one year ago, the patient feels her physical health is the same.  Compared to one year ago, the patient feels her mental health is better.    Reviewed chart for potential of high risk medication in the elderly: yes  Reviewed chart for potential of harmful drug interactions in the elderly:yes    Objective         Vitals:    12/14/20 1002   BP: 102/72   BP Location: Right arm   Patient Position: Sitting   Cuff Size: Adult   Pulse: 72   Temp: 97.1 °F (36.2 °C)   TempSrc: Infrared   SpO2: 98%   Weight: 54.4 kg (120 lb)   Height: 154.9 cm (61\")       Body mass index is 22.67 kg/m².  Discussed the patient's BMI with her. The BMI is in the acceptable range.    Physical Exam    Lab Results   Component Value Date    GLU 79 12/07/2020    CHLPL 185 12/07/2020    TRIG 35 12/07/2020    HDL 98 (H) 12/07/2020    LDL 79 12/07/2020    VLDL 8 12/07/2020        Assessment/Plan   Medicare Risks and Personalized Health Plan  CMS Preventative Services Quick Reference  Immunizations Discussed/Encouraged (specific immunizations; Pneumococcal 23 )    The above risks/problems have been discussed with the patient.  Pertinent information has been " shared with the patient in the After Visit Summary.  Follow up plans and orders are seen below in the Assessment/Plan Section.    Diagnoses and all orders for this visit:    1. Encounter for subsequent annual wellness visit (AWV) in Medicare patient (Primary)      Follow Up:  No follow-ups on file.     An After Visit Summary and PPPS were given to the patient.

## 2021-02-23 DIAGNOSIS — N95.1 VAGINAL DRYNESS, MENOPAUSAL: ICD-10-CM

## 2021-02-23 RX ORDER — ESTRADIOL 0.1 MG/G
CREAM VAGINAL
Qty: 42.5 G | Refills: 12 | Status: SHIPPED | OUTPATIENT
Start: 2021-02-23 | End: 2021-12-17

## 2021-03-26 ENCOUNTER — TELEPHONE (OUTPATIENT)
Dept: FAMILY MEDICINE CLINIC | Facility: CLINIC | Age: 76
End: 2021-03-26

## 2021-03-26 NOTE — TELEPHONE ENCOUNTER
[facility]- [facility] patent needs a 15 minute office visit scheduled to discuss medications- or you can transfer if she has questions      Thanks,  Ledy

## 2021-03-30 ENCOUNTER — OFFICE VISIT (OUTPATIENT)
Dept: FAMILY MEDICINE CLINIC | Facility: CLINIC | Age: 76
End: 2021-03-30

## 2021-03-30 VITALS
WEIGHT: 122 LBS | BODY MASS INDEX: 23.03 KG/M2 | HEART RATE: 72 BPM | TEMPERATURE: 97.7 F | DIASTOLIC BLOOD PRESSURE: 70 MMHG | OXYGEN SATURATION: 97 % | HEIGHT: 61 IN | SYSTOLIC BLOOD PRESSURE: 124 MMHG

## 2021-03-30 DIAGNOSIS — F41.9 ANXIETY: Primary | ICD-10-CM

## 2021-03-30 DIAGNOSIS — F51.01 PRIMARY INSOMNIA: ICD-10-CM

## 2021-03-30 PROCEDURE — 99214 OFFICE O/P EST MOD 30 MIN: CPT | Performed by: NURSE PRACTITIONER

## 2021-03-30 RX ORDER — DOXEPIN HYDROCHLORIDE 10 MG/1
10 CAPSULE ORAL NIGHTLY
Qty: 90 CAPSULE | Refills: 1 | Status: SHIPPED | OUTPATIENT
Start: 2021-03-30 | End: 2021-12-29

## 2021-03-30 RX ORDER — DULOXETIN HYDROCHLORIDE 20 MG/1
20 CAPSULE, DELAYED RELEASE ORAL DAILY
Qty: 90 CAPSULE | Refills: 1 | Status: SHIPPED | OUTPATIENT
Start: 2021-03-30 | End: 2021-10-18

## 2021-03-30 NOTE — PROGRESS NOTES
"Chief Complaint  Anxiety (discuss meds)    Subjective          Zehra Gong presents to [facility]  Ms. Gong presents today to have me start filling her medication for anxiety and insomnia. She states that now she is stable on her medication she prefers to not have to go into [place] just for medication refills at Dr. Wong's office.     I have reviewed the patient's medical history in detail and updated the computerized patient record.    Current Outpatient Medications:   •  BIOTIN PO, Take 1 tablet by mouth Daily., Disp: , Rfl:   •  calcium carbonate-vitamin d 600-400 MG-UNIT per tablet, Take 1 tablet by mouth Daily., Disp: , Rfl:   •  doxepin (SINEquan) 10 MG capsule, Take 1 capsule by mouth Every Night., Disp: 90 capsule, Rfl: 1  •  DULoxetine (CYMBALTA) 20 MG capsule, Take 1 capsule by mouth Daily. Do not fill until patient asks for it, Disp: 90 capsule, Rfl: 1  •  ESTRACE VAGINAL 0.1 MG/GM vaginal cream, Insert ONE gRAM into THE VAGINA ONE TIME PER WEEK, Disp: 42.5 g, Rfl: 12  •  valACYclovir (VALTREX) 500 MG tablet, Take 2 tablets by mouth Daily., Disp: 60 tablet, Rfl: 2     Objective   Vital Signs:   /70 (BP Location: Right arm, Patient Position: Sitting, Cuff Size: Adult)   Pulse 72   Temp 97.7 °F (36.5 °C) (Infrared)   Ht 154.9 cm (61\")   Wt 55.3 kg (122 lb)   SpO2 97%   BMI 23.05 kg/m²     Physical Exam  Constitutional:       Appearance: Normal appearance. She is normal weight.   Cardiovascular:      Rate and Rhythm: Normal rate and regular rhythm.      Pulses: Normal pulses.      Heart sounds: Normal heart sounds.   Pulmonary:      Effort: Pulmonary effort is normal.      Breath sounds: Normal breath sounds.   Skin:     General: Skin is warm and dry.   Neurological:      Mental Status: She is alert and oriented to person, place, and time.   Psychiatric:         Mood and Affect: Mood normal.         Behavior: Behavior normal.         Thought Content: " Thought content normal.         Judgment: Judgment normal.        Result Review :                 Assessment and Plan    Diagnoses and all orders for this visit:    1. Anxiety (Primary)  -     doxepin (SINEquan) 10 MG capsule; Take 1 capsule by mouth Every Night.  Dispense: 90 capsule; Refill: 1  -     DULoxetine (CYMBALTA) 20 MG capsule; Take 1 capsule by mouth Daily. Do not fill until patient asks for it  Dispense: 90 capsule; Refill: 1    2. Primary insomnia  -     doxepin (SINEquan) 10 MG capsule; Take 1 capsule by mouth Every Night.  Dispense: 90 capsule; Refill: 1    Ms. Gong appears to be doing well today.   I have refilled both her Cymbalta  20 mg marin and the Doxepin 10 mg nightly for her.   She is to follow up as needed and in December for her annual wellness exam.     Follow Up   Return if symptoms worsen or fail to improve, for Next scheduled follow up.  Patient was given instructions and counseling regarding her condition or for health maintenance advice. Please see specific information pulled into the AVS if appropriate.

## 2021-07-20 ENCOUNTER — OFFICE VISIT (OUTPATIENT)
Dept: FAMILY MEDICINE CLINIC | Facility: CLINIC | Age: 76
End: 2021-07-20

## 2021-07-20 VITALS
DIASTOLIC BLOOD PRESSURE: 76 MMHG | HEIGHT: 61 IN | SYSTOLIC BLOOD PRESSURE: 114 MMHG | WEIGHT: 118 LBS | BODY MASS INDEX: 22.28 KG/M2 | OXYGEN SATURATION: 98 % | HEART RATE: 70 BPM

## 2021-07-20 DIAGNOSIS — J30.1 SEASONAL ALLERGIC RHINITIS DUE TO POLLEN: Primary | ICD-10-CM

## 2021-07-20 PROCEDURE — 99213 OFFICE O/P EST LOW 20 MIN: CPT | Performed by: NURSE PRACTITIONER

## 2021-07-20 NOTE — PROGRESS NOTES
"Chief Complaint  Earache (for 1 week), Sore Throat, and Cough    Subjective          Zehra Gong presents to [facility]  URI   This is a new problem. The current episode started in the past 7 days. Associated symptoms include congestion, coughing (dry cough ), ear pain, sinus pain (pressure) and a sore throat (from coughing). Pertinent negatives include no headaches or plugged ear sensation. She has tried nothing for the symptoms. The treatment provided no relief.       I have reviewed the patient's medical history in detail and updated the computerized patient record.    Current Outpatient Medications:   •  BIOTIN PO, Take 1 tablet by mouth Daily., Disp: , Rfl:   •  calcium carbonate-vitamin d 600-400 MG-UNIT per tablet, Take 1 tablet by mouth Daily., Disp: , Rfl:   •  doxepin (SINEquan) 10 MG capsule, Take 1 capsule by mouth Every Night., Disp: 90 capsule, Rfl: 1  •  DULoxetine (CYMBALTA) 20 MG capsule, Take 1 capsule by mouth Daily. Do not fill until patient asks for it, Disp: 90 capsule, Rfl: 1  •  ESTRACE VAGINAL 0.1 MG/GM vaginal cream, Insert ONE gRAM into THE VAGINA ONE TIME PER WEEK, Disp: 42.5 g, Rfl: 12  •  valACYclovir (VALTREX) 500 MG tablet, Take 2 tablets by mouth Daily. (Patient taking differently: Take 1,000 mg by mouth Daily. As needed), Disp: 60 tablet, Rfl: 2     Objective   Vital Signs:   /76 (BP Location: Left arm, Patient Position: Sitting, Cuff Size: Small Adult)   Pulse 70   Ht 154.9 cm (61\")   Wt 53.5 kg (118 lb)   SpO2 98%   BMI 22.30 kg/m²     Physical Exam  Constitutional:       Appearance: Normal appearance.   HENT:      Right Ear: Tympanic membrane normal.      Left Ear: Tympanic membrane normal.      Nose: Congestion present. No rhinorrhea.      Mouth/Throat:      Mouth: Mucous membranes are moist.      Pharynx: Oropharynx is clear.   Cardiovascular:      Rate and Rhythm: Normal rate and regular rhythm.      Pulses: Normal pulses.      " Heart sounds: Normal heart sounds.   Pulmonary:      Effort: Pulmonary effort is normal.      Breath sounds: Normal breath sounds.   Skin:     General: Skin is warm and dry.   Neurological:      Mental Status: She is alert and oriented to person, place, and time.   Psychiatric:      Comments: No acute distress        Result Review :                 Assessment and Plan    Diagnoses and all orders for this visit:    1. Seasonal allergic rhinitis due to pollen (Primary)      You have been diagnosed with allergic rhinitis. Symptomatic treatment is best.  You may take Mucinex D for relieving congestion and cough.  If you have high blood pressure, do not take Mucinex D, instead opting for plain Mucinex and Coricidin HBP. Oral antihistamine, such as Allegra, Zyrtec or Claritin may help reduce ear pressure and relieve some nasal symptoms.  A saline nasal spray may be used to keep nose clear from discharge.  Be sure that you are increasing your intake of clear to decaffeinated fluids and get plenty of rest.  If your symptoms worsen or persist follow up as needed.      Follow Up   Return if symptoms worsen or fail to improve, for Next scheduled follow up.  Patient was given instructions and counseling regarding her condition or for health maintenance advice. Please see specific information pulled into the AVS if appropriate.

## 2021-07-27 RX ORDER — VALACYCLOVIR HYDROCHLORIDE 500 MG/1
1000 TABLET, FILM COATED ORAL DAILY
Qty: 60 TABLET | Refills: 5 | Status: SHIPPED | OUTPATIENT
Start: 2021-07-27 | End: 2022-08-22

## 2021-10-18 DIAGNOSIS — F41.9 ANXIETY: ICD-10-CM

## 2021-10-18 RX ORDER — DULOXETIN HYDROCHLORIDE 20 MG/1
20 CAPSULE, DELAYED RELEASE ORAL DAILY
Qty: 90 CAPSULE | Refills: 0 | Status: SHIPPED | OUTPATIENT
Start: 2021-10-18 | End: 2022-01-25

## 2021-12-01 DIAGNOSIS — E78.2 MIXED HYPERLIPIDEMIA: Primary | ICD-10-CM

## 2021-12-01 DIAGNOSIS — E05.00 GRAVES DISEASE: ICD-10-CM

## 2021-12-01 DIAGNOSIS — E55.9 VITAMIN D DEFICIENCY: ICD-10-CM

## 2021-12-01 DIAGNOSIS — M81.0 AGE RELATED OSTEOPOROSIS, UNSPECIFIED PATHOLOGICAL FRACTURE PRESENCE: ICD-10-CM

## 2021-12-17 ENCOUNTER — OFFICE VISIT (OUTPATIENT)
Dept: FAMILY MEDICINE CLINIC | Facility: CLINIC | Age: 76
End: 2021-12-17

## 2021-12-17 VITALS
SYSTOLIC BLOOD PRESSURE: 126 MMHG | WEIGHT: 117 LBS | HEART RATE: 71 BPM | HEIGHT: 61 IN | OXYGEN SATURATION: 98 % | BODY MASS INDEX: 22.09 KG/M2 | DIASTOLIC BLOOD PRESSURE: 76 MMHG

## 2021-12-17 DIAGNOSIS — Z23 NEED FOR INFLUENZA VACCINATION: ICD-10-CM

## 2021-12-17 DIAGNOSIS — Z00.00 ENCOUNTER FOR SUBSEQUENT ANNUAL WELLNESS VISIT (AWV) IN MEDICARE PATIENT: Primary | ICD-10-CM

## 2021-12-17 DIAGNOSIS — M54.16 CHRONIC RADICULAR PAIN OF LOWER BACK: ICD-10-CM

## 2021-12-17 DIAGNOSIS — G89.29 CHRONIC RADICULAR PAIN OF LOWER BACK: ICD-10-CM

## 2021-12-17 PROCEDURE — 1170F FXNL STATUS ASSESSED: CPT | Performed by: NURSE PRACTITIONER

## 2021-12-17 PROCEDURE — G0439 PPPS, SUBSEQ VISIT: HCPCS | Performed by: NURSE PRACTITIONER

## 2021-12-17 PROCEDURE — G0008 ADMIN INFLUENZA VIRUS VAC: HCPCS | Performed by: NURSE PRACTITIONER

## 2021-12-17 PROCEDURE — 90662 IIV NO PRSV INCREASED AG IM: CPT | Performed by: NURSE PRACTITIONER

## 2021-12-17 PROCEDURE — 1160F RVW MEDS BY RX/DR IN RCRD: CPT | Performed by: NURSE PRACTITIONER

## 2021-12-17 PROCEDURE — 1126F AMNT PAIN NOTED NONE PRSNT: CPT | Performed by: NURSE PRACTITIONER

## 2021-12-17 RX ORDER — DOCUSATE SODIUM 100 MG/1
100 CAPSULE, LIQUID FILLED ORAL EVERY MORNING
COMMUNITY
End: 2022-04-22

## 2021-12-17 NOTE — PROGRESS NOTES
The ABCs of the Annual Wellness Visit  Subsequent Medicare Wellness Visit    Chief Complaint   Patient presents with   • Medicare Wellness-subsequent     & review labs      Subjective    History of Present Illness:  Zehra Gong is a 76 y.o. female who presents for a Subsequent Medicare Wellness Visit.    The following portions of the patient's history were reviewed and   updated as appropriate: allergies, current medications, past family history, past medical history, past social history, past surgical history and problem list.    Compared to one year ago, the patient feels her physical   health is the same.    Compared to one year ago, the patient feels her mental   health is better.    Recent Hospitalizations:  She was not admitted to the hospital during the last year.       Current Medical Providers:  Patient Care Team:  Maru Kim APRN as PCP - General (Family Medicine)  Panda Rinaldi MD as Consulting Physician (Rheumatology)  Ewelina Childers MD as Consulting Physician (Obstetrics and Gynecology)  Georgette Winkler MD as Consulting Physician (Endocrinology)  George Sawyer MD as Consulting Physician (Hematology and Oncology)    Outpatient Medications Prior to Visit   Medication Sig Dispense Refill   • BIOTIN PO Take 1 tablet by mouth Daily.     • calcium carbonate-vitamin d 600-400 MG-UNIT per tablet Take 1 tablet by mouth Daily.     • docusate sodium (COLACE) 100 MG capsule Take 100 mg by mouth Every Morning.     • doxepin (SINEquan) 10 MG capsule Take 1 capsule by mouth Every Night. 90 capsule 1   • DULoxetine (CYMBALTA) 20 MG capsule Take 1 capsule by mouth Daily. Do not fill until patient asks for it (Patient taking differently: Take 20 mg by mouth Daily.) 90 capsule 0   • valACYclovir (VALTREX) 500 MG tablet Take 2 tablets by mouth Daily. As needed 60 tablet 5   • ESTRACE VAGINAL 0.1 MG/GM vaginal cream Insert ONE gRAM into THE VAGINA ONE TIME PER WEEK 42.5 g 12     No  "facility-administered medications prior to visit.       No opioid medication identified on active medication list. I have reviewed chart for other potential  high risk medication/s and harmful drug interactions in the elderly.          Aspirin is not on active medication list.  Aspirin use is not indicated based on review of current medical condition/s. Risk of harm outweighs potential benefits.  .    Patient Active Problem List   Diagnosis   • Insomnia   • Anxiety     Advance Care Planning  Advance Directive is not on file.  ACP discussion was held with the patient during this visit. believes it is on file    Review of Systems   Constitutional: Negative.    HENT: Negative.    Eyes:        Had cataract surgery this year   Respiratory: Negative.    Cardiovascular: Negative.    Gastrointestinal: Negative.    Genitourinary: Negative.    Musculoskeletal: Negative.    Skin: Negative.    Neurological: Negative.    Psychiatric/Behavioral: The patient is nervous/anxious.         Objective    Vitals:    12/17/21 1006   BP: 126/76   BP Location: Right arm   Patient Position: Sitting   Cuff Size: Adult   Pulse: 71   SpO2: 98%   Weight: 53.1 kg (117 lb)   Height: 154.9 cm (61\")     BMI Readings from Last 1 Encounters:   12/17/21 22.11 kg/m²   BMI is within normal parameters. No follow-up required.    Does the patient have evidence of cognitive impairment? No    Physical Exam  Neurological:      Mental Status: She is oriented to person, place, and time.       Lab Results   Component Value Date    CHLPL 195 12/10/2021    TRIG 51 12/10/2021    HDL 94 12/10/2021    LDL 91 12/10/2021    VLDL 10 12/10/2021            HEALTH RISK ASSESSMENT    Smoking Status:  Social History     Tobacco Use   Smoking Status Never Smoker   Smokeless Tobacco Never Used     Alcohol Consumption:  Social History     Substance and Sexual Activity   Alcohol Use No     Fall Risk Screen:    STEADI Fall Risk Assessment was completed, and patient is at LOW risk " for falls.Assessment completed on:12/17/2021    Depression Screening:  PHQ-2/PHQ-9 Depression Screening 12/17/2021   Little interest or pleasure in doing things 0   Feeling down, depressed, or hopeless 0   Total Score 0       Health Habits and Functional and Cognitive Screening:  Functional & Cognitive Status 12/17/2021   Do you have difficulty preparing food and eating? No   Do you have difficulty bathing yourself, getting dressed or grooming yourself? No   Do you have difficulty using the toilet? No   Do you have difficulty moving around from place to place? No   Do you have trouble with steps or getting out of a bed or a chair? No   Current Diet Well Balanced Diet   Dental Exam Up to date   Eye Exam Up to date   Exercise (times per week) 7 times per week   Current Exercises Include House Cleaning;Walking   Current Exercise Activities Include -   Do you need help using the phone?  No   Are you deaf or do you have serious difficulty hearing?  No   Do you need help with transportation? No   Do you need help shopping? No   Do you need help preparing meals?  No   Do you need help with housework?  No   Do you need help with laundry? No   Do you need help taking your medications? No   Do you need help managing money? No   Do you ever drive or ride in a car without wearing a seat belt? No   Have you felt unusual stress, anger or loneliness in the last month? No   Who do you live with? Spouse   If you need help, do you have trouble finding someone available to you? No   Have you been bothered in the last four weeks by sexual problems? No   Do you have difficulty concentrating, remembering or making decisions? No       Age-appropriate Screening Schedule:  Refer to the list below for future screening recommendations based on patient's age, sex and/or medical conditions. Orders for these recommended tests are listed in the plan section. The patient has been provided with a written plan.    Health Maintenance   Topic Date Due    • TDAP/TD VACCINES (1 - Tdap) Never done   • ZOSTER VACCINE (2 of 2) 03/13/2019   • INFLUENZA VACCINE  08/01/2021   • DXA SCAN  01/15/2022   • LIPID PANEL  12/10/2022   • MAMMOGRAM  04/26/2023              Assessment/Plan   CMS Preventative Services Quick Reference  Risk Factors Identified During Encounter  Immunizations Discussed/Encouraged (specific Immunizations; Tdap, Influenza and Pneumococcal 23  The above risks/problems have been discussed with the patient.  Follow up actions/plans if indicated are seen below in the Assessment/Plan Section.  Pertinent information has been shared with the patient in the After Visit Summary.    Diagnoses and all orders for this visit:    1. Encounter for subsequent annual wellness visit (AWV) in Medicare patient (Primary)        Follow Up:   No follow-ups on file.     An After Visit Summary and PPPS were made available to the patient.

## 2021-12-29 DIAGNOSIS — F51.01 PRIMARY INSOMNIA: ICD-10-CM

## 2021-12-29 DIAGNOSIS — F41.9 ANXIETY: ICD-10-CM

## 2021-12-29 RX ORDER — DOXEPIN HYDROCHLORIDE 10 MG/1
10 CAPSULE ORAL NIGHTLY
Qty: 90 CAPSULE | Refills: 1 | Status: SHIPPED | OUTPATIENT
Start: 2021-12-29 | End: 2022-07-01 | Stop reason: SDUPTHER

## 2022-01-13 ENCOUNTER — TELEPHONE (OUTPATIENT)
Dept: FAMILY MEDICINE CLINIC | Facility: CLINIC | Age: 77
End: 2022-01-13

## 2022-01-13 NOTE — TELEPHONE ENCOUNTER
Caller: Zehra Gong    Relationship: Self    Best call back number:    [phone]      What orders are you requesting (i.e. lab or imaging): BONE DENSITY    In what timeframe would the patient need to come in:     Where will you receive your lab/imaging services: [facility]    Additional notes: PATIENT IS CALLING IN TO GET ORDERS FOR A BONE DENSITY TEST DONE AT THE [facility] OFFICE

## 2022-01-14 DIAGNOSIS — Z13.820 SCREENING FOR OSTEOPOROSIS: ICD-10-CM

## 2022-01-14 DIAGNOSIS — M81.0 AGE-RELATED OSTEOPOROSIS WITHOUT CURRENT PATHOLOGICAL FRACTURE: Primary | ICD-10-CM

## 2022-01-25 DIAGNOSIS — F41.9 ANXIETY: ICD-10-CM

## 2022-01-25 RX ORDER — DULOXETIN HYDROCHLORIDE 20 MG/1
20 CAPSULE, DELAYED RELEASE ORAL DAILY
Qty: 90 CAPSULE | Refills: 1 | Status: SHIPPED | OUTPATIENT
Start: 2022-01-25 | End: 2022-07-11

## 2022-01-26 NOTE — PROGRESS NOTES
Please let her know that her DEXA scan shows she has osteopenia which put her at increase risk for bone fractures. Take vitamin D and calcium and get regular exercise to help with bone strength.

## 2022-01-28 ENCOUNTER — TELEPHONE (OUTPATIENT)
Dept: FAMILY MEDICINE CLINIC | Facility: CLINIC | Age: 77
End: 2022-01-28

## 2022-02-10 ENCOUNTER — HOSPITAL ENCOUNTER (OUTPATIENT)
Dept: HOSPITAL 49 - FAS | Age: 77
Discharge: HOME | End: 2022-02-10
Payer: MEDICARE

## 2022-02-10 VITALS — WEIGHT: 120 LBS | BODY MASS INDEX: 22.08 KG/M2 | HEIGHT: 62 IN

## 2022-02-10 DIAGNOSIS — H26.491: Primary | ICD-10-CM

## 2022-02-10 DIAGNOSIS — Z79.899: ICD-10-CM

## 2022-02-10 DIAGNOSIS — I10: ICD-10-CM

## 2022-04-22 ENCOUNTER — OFFICE VISIT (OUTPATIENT)
Dept: FAMILY MEDICINE CLINIC | Facility: CLINIC | Age: 77
End: 2022-04-22

## 2022-04-22 VITALS
HEART RATE: 66 BPM | OXYGEN SATURATION: 99 % | WEIGHT: 114 LBS | BODY MASS INDEX: 21.52 KG/M2 | DIASTOLIC BLOOD PRESSURE: 76 MMHG | HEIGHT: 61 IN | SYSTOLIC BLOOD PRESSURE: 114 MMHG

## 2022-04-22 DIAGNOSIS — F41.8 ANXIETY ABOUT HEALTH: Primary | ICD-10-CM

## 2022-04-22 PROCEDURE — 99213 OFFICE O/P EST LOW 20 MIN: CPT | Performed by: NURSE PRACTITIONER

## 2022-04-22 NOTE — PROGRESS NOTES
"Chief Complaint  Hypertension (Getting elevated readings x several months)    Subjective          Zehra Gong presents to [facility]  Ms. Gong is concerned today with elevated blood pressure readings at home. Her  B/P ranging from 129/76 to 149/83 at home using her blood pressure device. She reports his is making her anxious and she is fearful of having a stroke.     I have reviewed the patient's medical history in detail and updated the computerized patient record.    Current Outpatient Medications:   •  BIOTIN PO, Take 1 tablet by mouth Daily., Disp: , Rfl:   •  calcium carbonate-vitamin d 600-400 MG-UNIT per tablet, Take 1 tablet by mouth Daily., Disp: , Rfl:   •  doxepin (SINEquan) 10 MG capsule, Take 1 capsule by mouth Every Night. (Patient taking differently: Take 10 mg by mouth Every Night. Patient opening and dumping out half), Disp: 90 capsule, Rfl: 1  •  DULoxetine (CYMBALTA) 20 MG capsule, Take 1 capsule by mouth Daily., Disp: 90 capsule, Rfl: 1  •  valACYclovir (VALTREX) 500 MG tablet, Take 2 tablets by mouth Daily. As needed, Disp: 60 tablet, Rfl: 5     Objective   Vital Signs:   /76 (BP Location: Right arm, Patient Position: Sitting, Cuff Size: Adult)   Pulse 66   Ht 154.9 cm (61\")   Wt 51.7 kg (114 lb)   SpO2 99%   BMI 21.54 kg/m²     BMI is within normal parameters. No follow-up required.      Physical Exam  Constitutional:       Appearance: Normal appearance. She is normal weight.   Cardiovascular:      Rate and Rhythm: Normal rate and regular rhythm.      Pulses: Normal pulses.      Heart sounds: Normal heart sounds.   Pulmonary:      Effort: Pulmonary effort is normal.      Breath sounds: Normal breath sounds.   Skin:     General: Skin is warm and dry.   Neurological:      Mental Status: She is alert and oriented to person, place, and time.   Psychiatric:         Mood and Affect: Mood is anxious. Affect is flat.         Speech: Speech normal.        "  Behavior: Behavior normal.         Thought Content: Thought content normal.         Cognition and Memory: Cognition normal. She exhibits impaired recent memory.         Judgment: Judgment normal.        Result Review :                 Assessment and Plan    Diagnoses and all orders for this visit:    1. Anxiety about health (Primary)    Ms. Gong appears to be fine. She is anxious about her blood pressure.  Her blood pressure in the office is 114/76 and stable.   We discussed many variables affecting her blood pressure including the cuff is too large for her arm, her machine needs to be calibrated, the batteries may need to be changed or her anxiety may be increased. She does admit to having increased anxiety, she has never changed the batteries in her device and the cuff is too big for her arm.   She is going to get a smaller cuff and also change the batteries in her machine.     Follow Up   Return in about 8 months (around 12/17/2022), or if symptoms worsen or fail to improve, for Medicare Wellness, Fasting labs 1 week prior to next f/u.  Patient was given instructions and counseling regarding her condition or for health maintenance advice. Please see specific information pulled into the AVS if appropriate.

## 2022-06-23 ENCOUNTER — OFFICE VISIT (OUTPATIENT)
Dept: FAMILY MEDICINE CLINIC | Facility: CLINIC | Age: 77
End: 2022-06-23

## 2022-06-23 VITALS
WEIGHT: 115 LBS | HEART RATE: 77 BPM | OXYGEN SATURATION: 98 % | DIASTOLIC BLOOD PRESSURE: 68 MMHG | BODY MASS INDEX: 21.71 KG/M2 | SYSTOLIC BLOOD PRESSURE: 126 MMHG | HEIGHT: 61 IN

## 2022-06-23 DIAGNOSIS — Z86.39 HISTORY OF THYROID DISORDER: ICD-10-CM

## 2022-06-23 DIAGNOSIS — N82.3 RECTAL VAGINAL FISTULA: Primary | ICD-10-CM

## 2022-06-23 DIAGNOSIS — R63.4 WEIGHT LOSS: ICD-10-CM

## 2022-06-23 PROCEDURE — 99213 OFFICE O/P EST LOW 20 MIN: CPT | Performed by: NURSE PRACTITIONER

## 2022-06-23 NOTE — PROGRESS NOTES
"Chief Complaint  Urinary Tract Infection    Subjective        Zehra Gong presents to [facility]  Ms. Gong presents today with concerns that she may have a rectal fistula that started 6 months ago after doing some exercises.  She has noticed that her urine smells like stool. She has also noticed that she is having soft stools. Which is a change for her, she usually has large hard stools.     I have reviewed the patient's medical history in detail and updated the computerized patient record.    Current Outpatient Medications:   •  BIOTIN PO, Take 1 tablet by mouth Daily., Disp: , Rfl:   •  calcium carbonate-vitamin d 600-400 MG-UNIT per tablet, Take 1 tablet by mouth Daily., Disp: , Rfl:   •  doxepin (SINEquan) 10 MG capsule, Take 1 capsule by mouth Every Night. (Patient taking differently: Take 10 mg by mouth Every Night. Patient opening and dumping out half), Disp: 90 capsule, Rfl: 1  •  DULoxetine (CYMBALTA) 20 MG capsule, Take 1 capsule by mouth Daily., Disp: 90 capsule, Rfl: 1  •  valACYclovir (VALTREX) 500 MG tablet, Take 2 tablets by mouth Daily. As needed, Disp: 60 tablet, Rfl: 5  Objective   Vital Signs:  /68 (BP Location: Right arm, Patient Position: Sitting, Cuff Size: Adult)   Pulse 77   Ht 154.9 cm (61\")   Wt 52.2 kg (115 lb)   SpO2 98%   BMI 21.73 kg/m²   Estimated body mass index is 21.73 kg/m² as calculated from the following:    Height as of this encounter: 154.9 cm (61\").    Weight as of this encounter: 52.2 kg (115 lb).    BMI is within normal parameters. No other follow-up for BMI required.      Physical Exam  Vitals reviewed.   Constitutional:       Appearance: Normal appearance. She is normal weight.   Cardiovascular:      Rate and Rhythm: Normal rate and regular rhythm.      Pulses: Normal pulses.      Heart sounds: Normal heart sounds.   Pulmonary:      Effort: Pulmonary effort is normal.      Breath sounds: Normal breath sounds.   Skin:     " General: Skin is warm and dry.   Neurological:      Mental Status: She is alert and oriented to person, place, and time.   Psychiatric:      Comments: No acute distress        Result Review :                Assessment and Plan   Diagnoses and all orders for this visit:    1. Rectal vaginal fistula (Primary)  -     Ambulatory Referral to Gynecologic Urology    2. Weight loss  -     TSH    3. History of thyroid disorder  -     TSH    Other orders  -     Cancel: POC Urinalysis Dipstick, Automated  -     Cancel: Urine Culture - Urine, Urine, Clean Catch  -     Cancel: Urinalysis With Microscopic - Urine, Clean Catch    Ms. Gong appears to be doing well.   I am referring her to Gynecologic urology for further evaluation of possible rectal fistula.  She is also concerned that her thyroid level is off because she has increased fatigue. We discussed that her level was normal six months ago but I will retest her TSH today.  We discussed that her Doxepin can also cause fatigue.          Follow Up   Return if symptoms worsen or fail to improve, for Next scheduled follow up.  Patient was given instructions and counseling regarding her condition or for health maintenance advice. Please see specific information pulled into the AVS if appropriate.

## 2022-06-24 ENCOUNTER — TELEPHONE (OUTPATIENT)
Dept: FAMILY MEDICINE CLINIC | Facility: CLINIC | Age: 77
End: 2022-06-24

## 2022-06-24 LAB — TSH SERPL DL<=0.005 MIU/L-ACNC: 1.67 UIU/ML (ref 0.45–4.5)

## 2022-07-01 DIAGNOSIS — F51.01 PRIMARY INSOMNIA: ICD-10-CM

## 2022-07-01 DIAGNOSIS — F41.9 ANXIETY: ICD-10-CM

## 2022-07-01 RX ORDER — DOXEPIN HYDROCHLORIDE 10 MG/1
10 CAPSULE ORAL NIGHTLY
Qty: 30 CAPSULE | Refills: 5 | OUTPATIENT
Start: 2022-07-01

## 2022-07-01 RX ORDER — DOXEPIN HYDROCHLORIDE 10 MG/1
10 CAPSULE ORAL NIGHTLY
Qty: 90 CAPSULE | Refills: 1 | Status: SHIPPED | OUTPATIENT
Start: 2022-07-01

## 2022-07-11 DIAGNOSIS — F41.9 ANXIETY: ICD-10-CM

## 2022-07-11 RX ORDER — DULOXETIN HYDROCHLORIDE 20 MG/1
20 CAPSULE, DELAYED RELEASE ORAL DAILY
Qty: 90 CAPSULE | Refills: 1 | Status: SHIPPED | OUTPATIENT
Start: 2022-07-11 | End: 2022-12-27

## 2022-08-22 RX ORDER — VALACYCLOVIR HYDROCHLORIDE 500 MG/1
1000 TABLET, FILM COATED ORAL DAILY
Qty: 60 TABLET | Refills: 5 | Status: SHIPPED | OUTPATIENT
Start: 2022-08-22

## 2022-12-08 ENCOUNTER — OFFICE VISIT (OUTPATIENT)
Dept: FAMILY MEDICINE CLINIC | Facility: CLINIC | Age: 77
End: 2022-12-08

## 2022-12-08 VITALS
WEIGHT: 120.7 LBS | BODY MASS INDEX: 22.79 KG/M2 | SYSTOLIC BLOOD PRESSURE: 136 MMHG | HEART RATE: 74 BPM | HEIGHT: 61 IN | OXYGEN SATURATION: 99 % | DIASTOLIC BLOOD PRESSURE: 84 MMHG

## 2022-12-08 DIAGNOSIS — I10 PRIMARY HYPERTENSION: Primary | ICD-10-CM

## 2022-12-08 PROCEDURE — 99213 OFFICE O/P EST LOW 20 MIN: CPT | Performed by: NURSE PRACTITIONER

## 2022-12-08 RX ORDER — HYDRALAZINE HYDROCHLORIDE 10 MG/1
10 TABLET, FILM COATED ORAL AS NEEDED
Qty: 30 TABLET | Refills: 5 | Status: SHIPPED | OUTPATIENT
Start: 2022-12-08

## 2022-12-08 NOTE — PROGRESS NOTES
"Chief Complaint  Hypertension (BLOOD PRESSURE CHECK)    Subjective        Zehra Gong presents to [facility]  Hypertension  This is a chronic problem. The current episode started more than 1 month ago. The problem has been waxing and waning since onset. The problem is uncontrolled. Associated symptoms include anxiety, blurred vision and headaches. Pertinent negatives include no malaise/fatigue, palpitations, peripheral edema or shortness of breath. There are no associated agents to hypertension. Risk factors for coronary artery disease include post-menopausal state. Past treatments include nothing. Current antihypertension treatment includes nothing. There are no compliance problems.      I have reviewed the patient's medical history in detail and updated the computerized patient record.    Current Outpatient Medications:   •  BIOTIN PO, Take 1 tablet by mouth Daily., Disp: , Rfl:   •  calcium carbonate-vitamin d 600-400 MG-UNIT per tablet, Take 1 tablet by mouth Daily., Disp: , Rfl:   •  doxepin (SINEquan) 10 MG capsule, Take 1 capsule by mouth Every Night. Patient opening and dumping out half, Disp: 90 capsule, Rfl: 1  •  DULoxetine (CYMBALTA) 20 MG capsule, Take 1 capsule by mouth Daily., Disp: 90 capsule, Rfl: 1  •  valACYclovir (VALTREX) 500 MG tablet, Take 2 tablets by mouth Daily. As needed, Disp: 60 tablet, Rfl: 5     Objective   Vital Signs:  /70 (BP Location: Left arm, Patient Position: Sitting, Cuff Size: Adult)   Pulse 74   Ht 154.9 cm (60.98\")   Wt 54.7 kg (120 lb 11.2 oz)   SpO2 99%   BMI 22.82 kg/m²   Estimated body mass index is 22.82 kg/m² as calculated from the following:    Height as of this encounter: 154.9 cm (60.98\").    Weight as of this encounter: 54.7 kg (120 lb 11.2 oz).    BMI is within normal parameters. No other follow-up for BMI required.      Physical Exam  Constitutional:       Appearance: Normal appearance. She is normal weight. " "  Cardiovascular:      Rate and Rhythm: Normal rate and regular rhythm.      Pulses: Normal pulses.      Heart sounds: Normal heart sounds.   Pulmonary:      Effort: Pulmonary effort is normal.      Breath sounds: Normal breath sounds.   Skin:     General: Skin is warm and dry.   Neurological:      Mental Status: She is alert.   Psychiatric:         Mood and Affect: Mood normal.         Behavior: Behavior normal.         Thought Content: Thought content normal.         Judgment: Judgment normal.        Result Review :             Vitals:    12/08/22 0801 12/08/22 0818   BP: 125/70 136/84   BP Location: Left arm    Patient Position: Sitting    Cuff Size: Adult    Pulse: 74    SpO2: 99%    Weight: 54.7 kg (120 lb 11.2 oz)    Height: 154.9 cm (60.98\")         Assessment and Plan   Diagnoses and all orders for this visit:    1. Primary hypertension (Primary)    Ms. Gong appears to be doing well today.  Her blood pressure today in the office was 125/70 and 136/84.  I have reviewed the log of her blood pressures that she brought with her today. Her blood pressures at home have been ranging from 128-154/70-80. She is to take Hydralazine 10 mg as needed for elevated blood pressure.   She returns in January for an annual physical and I will assess her blood pressure again at that time.         Follow Up   No follow-ups on file.  Patient was given instructions and counseling regarding her condition or for health maintenance advice. Please see specific information pulled into the AVS if appropriate.       "

## 2022-12-27 DIAGNOSIS — F41.9 ANXIETY: ICD-10-CM

## 2022-12-27 RX ORDER — DULOXETIN HYDROCHLORIDE 20 MG/1
CAPSULE, DELAYED RELEASE ORAL
Qty: 90 CAPSULE | Refills: 1 | Status: SHIPPED | OUTPATIENT
Start: 2022-12-27

## 2023-01-03 DIAGNOSIS — E78.2 MIXED HYPERLIPIDEMIA: ICD-10-CM

## 2023-01-03 DIAGNOSIS — I10 PRIMARY HYPERTENSION: Primary | ICD-10-CM

## 2023-01-10 LAB
ALBUMIN SERPL-MCNC: 4.8 G/DL (ref 3.5–5.2)
ALBUMIN/GLOB SERPL: 2.7 G/DL
ALP SERPL-CCNC: 86 U/L (ref 39–117)
ALT SERPL-CCNC: 18 U/L (ref 1–33)
AST SERPL-CCNC: 18 U/L (ref 1–32)
BILIRUB SERPL-MCNC: 0.6 MG/DL (ref 0–1.2)
BUN SERPL-MCNC: 18 MG/DL (ref 8–23)
BUN/CREAT SERPL: 26.9 (ref 7–25)
CALCIUM SERPL-MCNC: 9.7 MG/DL (ref 8.6–10.5)
CHLORIDE SERPL-SCNC: 101 MMOL/L (ref 98–107)
CHOLEST SERPL-MCNC: 206 MG/DL (ref 0–200)
CHOLEST/HDLC SERPL: 2.08 {RATIO}
CO2 SERPL-SCNC: 28.3 MMOL/L (ref 22–29)
CREAT SERPL-MCNC: 0.67 MG/DL (ref 0.57–1)
EGFRCR SERPLBLD CKD-EPI 2021: 89.6 ML/MIN/1.73
GLOBULIN SER CALC-MCNC: 1.8 GM/DL
GLUCOSE SERPL-MCNC: 87 MG/DL (ref 65–99)
HDLC SERPL-MCNC: 99 MG/DL (ref 40–60)
LDLC SERPL CALC-MCNC: 98 MG/DL (ref 0–100)
POTASSIUM SERPL-SCNC: 4.4 MMOL/L (ref 3.5–5.2)
PROT SERPL-MCNC: 6.6 G/DL (ref 6–8.5)
SODIUM SERPL-SCNC: 139 MMOL/L (ref 136–145)
TRIGL SERPL-MCNC: 48 MG/DL (ref 0–150)
VLDLC SERPL CALC-MCNC: 9 MG/DL (ref 5–40)

## 2023-01-17 ENCOUNTER — OFFICE VISIT (OUTPATIENT)
Dept: FAMILY MEDICINE CLINIC | Facility: CLINIC | Age: 78
End: 2023-01-17
Payer: MEDICARE

## 2023-01-17 VITALS
DIASTOLIC BLOOD PRESSURE: 70 MMHG | SYSTOLIC BLOOD PRESSURE: 128 MMHG | OXYGEN SATURATION: 100 % | WEIGHT: 118.1 LBS | HEART RATE: 70 BPM | BODY MASS INDEX: 22.3 KG/M2 | HEIGHT: 61 IN

## 2023-01-17 DIAGNOSIS — Z23 NEED FOR PROPHYLACTIC VACCINATION AGAINST STREPTOCOCCUS PNEUMONIAE (PNEUMOCOCCUS): ICD-10-CM

## 2023-01-17 DIAGNOSIS — Z00.00 ENCOUNTER FOR SUBSEQUENT ANNUAL WELLNESS VISIT (AWV) IN MEDICARE PATIENT: Primary | ICD-10-CM

## 2023-01-17 DIAGNOSIS — Z86.39 HISTORY OF THYROID DISORDER: ICD-10-CM

## 2023-01-17 LAB — TSH SERPL DL<=0.005 MIU/L-ACNC: 2.22 UIU/ML (ref 0.27–4.2)

## 2023-01-17 PROCEDURE — G0439 PPPS, SUBSEQ VISIT: HCPCS | Performed by: NURSE PRACTITIONER

## 2023-01-17 PROCEDURE — 90677 PCV20 VACCINE IM: CPT | Performed by: NURSE PRACTITIONER

## 2023-01-17 PROCEDURE — G0009 ADMIN PNEUMOCOCCAL VACCINE: HCPCS | Performed by: NURSE PRACTITIONER

## 2023-01-17 PROCEDURE — 1170F FXNL STATUS ASSESSED: CPT | Performed by: NURSE PRACTITIONER

## 2023-01-17 PROCEDURE — 1159F MED LIST DOCD IN RCRD: CPT | Performed by: NURSE PRACTITIONER

## 2023-01-17 NOTE — PROGRESS NOTES
The ABCs of the Annual Wellness Visit  Subsequent Medicare Wellness Visit    Subjective      Zehra Gong is a 77 y.o. female who presents for a Subsequent Medicare Wellness Visit.    The following portions of the patient's history were reviewed and   updated as appropriate: allergies, current medications, past family history, past medical history, past social history, past surgical history and problem list.    Compared to one year ago, the patient feels her physical   health is the same.    Compared to one year ago, the patient feels her mental   health is the same.    Recent Hospitalizations:  She was not admitted to the hospital during the last year.       Current Medical Providers:  Patient Care Team:  Maru Kim APRN as PCP - General (Family Medicine)  Panda Rinaldi MD as Consulting Physician (Rheumatology)  Ewelina Childers MD as Consulting Physician (Obstetrics and Gynecology)  Georgette Winkler MD as Consulting Physician (Endocrinology)  George Sawyer MD as Consulting Physician (Hematology and Oncology)  Angela Hyman MD as Consulting Physician (Urogynecology)    Outpatient Medications Prior to Visit   Medication Sig Dispense Refill   • calcium carbonate-vitamin d 600-400 MG-UNIT per tablet Take 1 tablet by mouth Daily.     • CALCIUM PO Take  by mouth.     • doxepin (SINEquan) 10 MG capsule Take 1 capsule by mouth Every Night. Patient opening and dumping out half 90 capsule 1   • DULoxetine (CYMBALTA) 20 MG capsule TAKE ONE CAPSULE BY MOUTH EVERY DAY 90 capsule 1   • hydrALAZINE (APRESOLINE) 10 MG tablet Take 1 tablet by mouth As Needed (elevated blood pressure). 30 tablet 5   • valACYclovir (VALTREX) 500 MG tablet Take 2 tablets by mouth Daily. As needed 60 tablet 5   • BIOTIN PO Take 1 tablet by mouth Daily.       No facility-administered medications prior to visit.       No opioid medication identified on active medication list. I have reviewed chart for other potential  high risk  "medication/s and harmful drug interactions in the elderly.          Aspirin is not on active medication list.  Aspirin use is not indicated based on review of current medical condition/s. Risk of harm outweighs potential benefits.  .    Patient Active Problem List   Diagnosis   • Insomnia   • Anxiety     Advance Care Planning  Advance Directive is not on file.  ACP discussion was held with the patient during this visit. Patient has an advance directive (not in EMR), copy requested.     Objective    Vitals:    01/17/23 0959   BP: 128/70   BP Location: Right arm   Patient Position: Sitting   Pulse: 70   SpO2: 100%   Weight: 53.6 kg (118 lb 1.6 oz)   Height: 154.9 cm (60.98\")     Estimated body mass index is 22.33 kg/m² as calculated from the following:    Height as of this encounter: 154.9 cm (60.98\").    Weight as of this encounter: 53.6 kg (118 lb 1.6 oz).    BMI is within normal parameters. No other follow-up for BMI required.      Does the patient have evidence of cognitive impairment?   No    Lab Results   Component Value Date    CHLPL 206 (H) 01/10/2023    TRIG 48 01/10/2023    HDL 99 (H) 01/10/2023    LDL 98 01/10/2023    VLDL 9 01/10/2023          HEALTH RISK ASSESSMENT    Smoking Status:  Social History     Tobacco Use   Smoking Status Never   Smokeless Tobacco Never     Alcohol Consumption:  Social History     Substance and Sexual Activity   Alcohol Use No     Fall Risk Screen:    PATRICKADI Fall Risk Assessment was completed, and patient is at LOW risk for falls.Assessment completed on:1/17/2023    Depression Screening:  PHQ-2/PHQ-9 Depression Screening 1/17/2023   Little Interest or Pleasure in Doing Things 0-->not at all   Feeling Down, Depressed or Hopeless 0-->not at all   Trouble Falling or Staying Asleep, or Sleeping Too Much 0-->not at all   Feeling Tired or Having Little Energy 0-->not at all   Poor Appetite or Overeating 0-->not at all   Feeling Bad about Yourself - or that You are a Failure or Have " Let Yourself or Your Family Down 0-->not at all   Trouble Concentrating on Things, Such as Reading the Newspaper or Watching Television 0-->not at all   Moving or Speaking So Slowly that Other People Could Have Noticed? Or the Opposite - Being So Fidgety 0-->not at all   Thoughts that You Would be Better Off Dead or of Hurting Yourself in Some Way 0-->not at all   PHQ-9: Brief Depression Severity Measure Score 0   If You Checked Off Any Problems, How Difficult Have These Problems Made It For You to Do Your Work, Take Care of Things at Home, or Get Along with Other People? not difficult at all       Health Habits and Functional and Cognitive Screening:  Functional & Cognitive Status 12/17/2021   Do you have difficulty preparing food and eating? No   Do you have difficulty bathing yourself, getting dressed or grooming yourself? No   Do you have difficulty using the toilet? No   Do you have difficulty moving around from place to place? No   Do you have trouble with steps or getting out of a bed or a chair? No   Current Diet Well Balanced Diet   Dental Exam Up to date   Eye Exam Up to date   Exercise (times per week) 7 times per week   Current Exercises Include House Cleaning;Walking   Current Exercise Activities Include -   Do you need help using the phone?  No   Are you deaf or do you have serious difficulty hearing?  No   Do you need help with transportation? No   Do you need help shopping? No   Do you need help preparing meals?  No   Do you need help with housework?  No   Do you need help with laundry? No   Do you need help taking your medications? No   Do you need help managing money? No   Do you ever drive or ride in a car without wearing a seat belt? No   Have you felt unusual stress, anger or loneliness in the last month? No   Who do you live with? Spouse   If you need help, do you have trouble finding someone available to you? No   Have you been bothered in the last four weeks by sexual problems? No   Do you  have difficulty concentrating, remembering or making decisions? No       Age-appropriate Screening Schedule:  Refer to the list below for future screening recommendations based on patient's age, sex and/or medical conditions. Orders for these recommended tests are listed in the plan section. The patient has been provided with a written plan.    Health Maintenance   Topic Date Due   • TDAP/TD VACCINES (1 - Tdap) Never done   • ZOSTER VACCINE (3 of 3) 03/13/2019   • LIPID PANEL  01/10/2024   • DXA SCAN  01/26/2024   • MAMMOGRAM  05/16/2024   • INFLUENZA VACCINE  Completed                CMS Preventative Services Quick Reference  Risk Factors Identified During Encounter:    Immunizations Discussed/Encouraged: Prevnar 20 (Pneumococcal 20-valent conjugate)    The above risks/problems have been discussed with the patient.  Pertinent information has been shared with the patient in the After Visit Summary.    Diagnoses and all orders for this visit:    1. Encounter for subsequent annual wellness visit (AWV) in Medicare patient (Primary)        Follow Up:   Next Medicare Wellness visit to be scheduled in 1 year.      An After Visit Summary and PPPS were made available to the patient.

## 2023-01-17 NOTE — PATIENT INSTRUCTIONS
Medicare Wellness  Personal Prevention Plan of Service     Date of Office Visit:    Encounter Provider:  THIEN Salgado  Place of Service:  [facility]  Patient Name: Zehra Gong  :  1945    As part of the Medicare Wellness portion of your visit today, we are providing you with this personalized preventive plan of services (PPPS). This plan is based upon recommendations of the [place] Preventive Services Task Force (USPSTF) and the Advisory Committee on Immunization Practices (ACIP).    This lists the preventive care services that should be considered, and provides dates of when you are due. Items listed as completed are up-to-date and do not require any further intervention.    Health Maintenance   Topic Date Due    TDAP/TD VACCINES (1 - Tdap) Never done    ZOSTER VACCINE (3 of 3) 2019    Pneumococcal Vaccine 65+ (2 - PPSV23 if available, else PCV20) 10/16/2019    COVID-19 Vaccine (5 - Booster for Pfizer series) 2022    ANNUAL WELLNESS VISIT  2022    LIPID PANEL  01/10/2024    DXA SCAN  2024    MAMMOGRAM  2024    COLORECTAL CANCER SCREENING  2031    HEPATITIS C SCREENING  Completed    INFLUENZA VACCINE  Completed       No orders of the defined types were placed in this encounter.      No follow-ups on file.

## 2023-01-19 ENCOUNTER — TELEPHONE (OUTPATIENT)
Dept: FAMILY MEDICINE CLINIC | Facility: CLINIC | Age: 78
End: 2023-01-19
Payer: MEDICARE

## 2023-01-19 NOTE — TELEPHONE ENCOUNTER
----- Message from THIEN Salgado sent at 1/18/2023  8:21 AM EST -----  Please let her know her thyroid level is normal. thanks

## 2023-04-24 ENCOUNTER — TELEPHONE (OUTPATIENT)
Dept: FAMILY MEDICINE CLINIC | Facility: CLINIC | Age: 78
End: 2023-04-24
Payer: MEDICARE

## 2023-04-24 DIAGNOSIS — Z12.31 ENCOUNTER FOR SCREENING MAMMOGRAM FOR MALIGNANT NEOPLASM OF BREAST: Primary | ICD-10-CM

## 2023-04-24 NOTE — TELEPHONE ENCOUNTER
Caller: Zehra Gong    Relationship: Self    Best call back number: [phone]  What orders are you requesting (i.e. lab or imaging): IMAGING (MAMMOGRAM)    In what timeframe would the patient need to come in: AS SOON AS POSSIBLE     Where will you receive your lab/imaging services: [facility]

## 2023-05-23 ENCOUNTER — TELEPHONE (OUTPATIENT)
Dept: FAMILY MEDICINE CLINIC | Facility: CLINIC | Age: 78
End: 2023-05-23
Payer: MEDICARE

## 2023-05-23 NOTE — TELEPHONE ENCOUNTER
CALLED AND SPOKE WITH PATIENT AND LET HIM KNOW HER MAMMOGRAM WAS NEGATIVE (PER VERBAL) HE WAS IN UNDERSTANDING

## 2023-05-23 NOTE — TELEPHONE ENCOUNTER
----- Message from THIEN Salgado sent at 5/23/2023 12:13 PM EDT -----  Please let he know her mammogram is negative.

## 2023-05-31 ENCOUNTER — OFFICE VISIT (OUTPATIENT)
Dept: FAMILY MEDICINE CLINIC | Facility: CLINIC | Age: 78
End: 2023-05-31

## 2023-05-31 VITALS
SYSTOLIC BLOOD PRESSURE: 118 MMHG | OXYGEN SATURATION: 100 % | WEIGHT: 119.2 LBS | BODY MASS INDEX: 22.51 KG/M2 | HEIGHT: 61 IN | HEART RATE: 87 BPM | DIASTOLIC BLOOD PRESSURE: 78 MMHG

## 2023-05-31 DIAGNOSIS — R09.89 UPPER RESPIRATORY SYMPTOM: Primary | ICD-10-CM

## 2023-05-31 DIAGNOSIS — R05.2 SUBACUTE COUGH: ICD-10-CM

## 2023-05-31 LAB
EXPIRATION DATE: NORMAL
INTERNAL CONTROL: NORMAL
Lab: NORMAL
SARS-COV-2 AG UPPER RESP QL IA.RAPID: NOT DETECTED

## 2023-05-31 PROCEDURE — 1160F RVW MEDS BY RX/DR IN RCRD: CPT | Performed by: NURSE PRACTITIONER

## 2023-05-31 PROCEDURE — 99213 OFFICE O/P EST LOW 20 MIN: CPT | Performed by: NURSE PRACTITIONER

## 2023-05-31 PROCEDURE — 1159F MED LIST DOCD IN RCRD: CPT | Performed by: NURSE PRACTITIONER

## 2023-05-31 PROCEDURE — 87426 SARSCOV CORONAVIRUS AG IA: CPT | Performed by: NURSE PRACTITIONER

## 2023-05-31 RX ORDER — FLUTICASONE PROPIONATE 50 MCG
SPRAY, SUSPENSION (ML) NASAL
COMMUNITY
Start: 2023-02-20

## 2023-05-31 RX ORDER — BENZONATATE 100 MG/1
100 CAPSULE ORAL 3 TIMES DAILY PRN
Qty: 90 CAPSULE | Refills: 3 | Status: SHIPPED | OUTPATIENT
Start: 2023-05-31

## 2023-05-31 RX ORDER — VALACYCLOVIR HYDROCHLORIDE 500 MG/1
500 TABLET, FILM COATED ORAL
COMMUNITY

## 2023-05-31 RX ORDER — HYDRALAZINE HYDROCHLORIDE 10 MG/1
10 TABLET, FILM COATED ORAL 3 TIMES DAILY
COMMUNITY

## 2023-05-31 NOTE — PROGRESS NOTES
"Chief Complaint  Cough    Subjective        Zehra Gong presents to [facility]  URI   This is a new problem. The current episode started in the past 7 days. The problem has been gradually worsening. There has been no fever. Associated symptoms include congestion, coughing and rhinorrhea. Pertinent negatives include no plugged ear sensation, sinus pain, sneezing or wheezing. Sore throat: scratchy throat. She has tried antihistamine (only took one dose) for the symptoms.     I have reviewed the patient's medical history in detail and updated the computerized patient record.    Current Outpatient Medications:   •  calcium carbonate-vitamin d 600-400 MG-UNIT per tablet, Take 1 tablet by mouth Daily., Disp: , Rfl:   •  CALCIUM PO, Take  by mouth., Disp: , Rfl:   •  doxepin (SINEquan) 10 MG capsule, Take 1 capsule by mouth Every Night. Patient opening and dumping out half, Disp: 90 capsule, Rfl: 1  •  DULoxetine (CYMBALTA) 20 MG capsule, TAKE ONE CAPSULE BY MOUTH EVERY DAY, Disp: 90 capsule, Rfl: 1  •  fluticasone (FLONASE) 50 MCG/ACT nasal spray, SHAKE LIQUID AND USE 1 SPRAY IN EACH NOSTRIL DAILY FOR 14 DAYS, Disp: , Rfl:   •  hydrALAZINE (APRESOLINE) 10 MG tablet, Take 1 tablet by mouth As Needed (elevated blood pressure)., Disp: 30 tablet, Rfl: 5  •  hydrALAZINE (APRESOLINE) 10 MG tablet, Take 1 tablet by mouth 3 (Three) Times a Day., Disp: , Rfl:   •  valACYclovir (VALTREX) 500 MG tablet, Take 2 tablets by mouth Daily. As needed, Disp: 60 tablet, Rfl: 5  •  valACYclovir (VALTREX) 500 MG tablet, Take 1 tablet by mouth., Disp: , Rfl:   •  benzonatate (TESSALON) 100 MG capsule, Take 1 capsule by mouth 3 (Three) Times a Day As Needed for Cough., Disp: 90 capsule, Rfl: 3     Objective   Vital Signs:  /78 (BP Location: Left arm, Patient Position: Sitting, Cuff Size: Adult)   Pulse 87   Ht 154.9 cm (60.98\")   Wt 54.1 kg (119 lb 3.2 oz)   SpO2 100%   BMI 22.53 kg/m²   Estimated " "body mass index is 22.53 kg/m² as calculated from the following:    Height as of this encounter: 154.9 cm (60.98\").    Weight as of this encounter: 54.1 kg (119 lb 3.2 oz).       BMI is within normal parameters. No other follow-up for BMI required.      Physical Exam  Vitals reviewed.   Constitutional:       Appearance: Normal appearance. She is normal weight.   HENT:      Right Ear: Tympanic membrane normal.      Left Ear: Tympanic membrane normal.      Nose: Congestion present. No rhinorrhea.      Mouth/Throat:      Mouth: Mucous membranes are moist.      Pharynx: Oropharynx is clear. No oropharyngeal exudate or posterior oropharyngeal erythema.   Cardiovascular:      Rate and Rhythm: Normal rate and regular rhythm.      Pulses: Normal pulses.      Heart sounds: Normal heart sounds.   Pulmonary:      Effort: Pulmonary effort is normal.      Breath sounds: Normal breath sounds.   Skin:     General: Skin is warm and dry.   Neurological:      Mental Status: She is alert and oriented to person, place, and time.   Psychiatric:      Comments: No acute distress        Result Review :                   Assessment and Plan   Diagnoses and all orders for this visit:    1. Upper respiratory symptom (Primary)  -     POCT VERITOR SARS-CoV-2 Antigen    2. Subacute cough  -     benzonatate (TESSALON) 100 MG capsule; Take 1 capsule by mouth 3 (Three) Times a Day As Needed for Cough.  Dispense: 90 capsule; Refill: 3    You have been diagnosed with allergic rhinitis. Symptomatic treatment is best.  You may take Mucinex D for relieving congestion and cough.  If you have high blood pressure, do not take Mucinex D, instead opting for plain Mucinex and Coricidin HBP. Oral antihistamine, such as Allegra, Zyrtec or Claritin may help reduce ear pressure and relieve some nasal symptoms.  A saline nasal spray may be used to keep nose clear from discharge.  Be sure that you are increasing your intake of clear to decaffeinated fluids and get " plenty of rest.  If your symptoms worsen or persist follow up as needed.         Follow Up   Return in about 2 weeks (around 6/14/2023) for f/u on blood pressure.  Patient was given instructions and counseling regarding her condition or for health maintenance advice. Please see specific information pulled into the AVS if appropriate.

## 2023-05-31 NOTE — PATIENT INSTRUCTIONS
You have been diagnosed with allergic rhinitis. Symptomatic treatment is best.  You may take Mucinex D for relieving congestion and cough.  If you have high blood pressure, do not take Mucinex D, instead opting for plain Mucinex and Coricidin HBP. Oral antihistamine, such as Allegra, Zyrtec or Claritin may help reduce ear pressure and relieve some nasal symptoms.  A saline nasal spray may be used to keep nose clear from discharge.  Be sure that you are increasing your intake of clear to decaffeinated fluids and get plenty of rest.  If your symptoms worsen or persist follow up as needed.

## 2023-06-01 ENCOUNTER — TELEPHONE (OUTPATIENT)
Dept: FAMILY MEDICINE CLINIC | Facility: CLINIC | Age: 78
End: 2023-06-01

## 2023-06-01 NOTE — TELEPHONE ENCOUNTER
CALLED AND SPOKE WITH PATIENT AND LET HER KNOW THERE IS NOTHING STRONGER, WE RECOMMEND A NETI POT, SHE WAS IN UNDERSTANDING

## 2023-06-01 NOTE — TELEPHONE ENCOUNTER
Caller: Zehra Gong    Relationship: Self    Best call back number: [phone]    What medication are you requesting: STRONGER ANTIHISTAMINE    What are your current symptoms: BAD RUNNY NOSE    Have you had these symptoms before:    [x] Yes  [] No    Have you been treated for these symptoms before:   [x] Yes  [] No    If a prescription is needed, what is your preferred pharmacy and phone number: [facility] - [place] [address] [facility] [phone] [facility] [phone]      Additional notes: PATIENT STATED THE OVER THE COUNTER EQUATE ALLERGY RELIEF ANTIHISTAMINE IS NOT HELPING FOR THE FULL 24 HOURS.    SHE WOULD LIKE TO REQUEST A STRONGER MEDICATION.

## 2023-06-15 ENCOUNTER — OFFICE VISIT (OUTPATIENT)
Dept: FAMILY MEDICINE CLINIC | Facility: CLINIC | Age: 78
End: 2023-06-15
Payer: MEDICARE

## 2023-06-15 VITALS
SYSTOLIC BLOOD PRESSURE: 118 MMHG | OXYGEN SATURATION: 100 % | DIASTOLIC BLOOD PRESSURE: 64 MMHG | HEART RATE: 72 BPM | RESPIRATION RATE: 10 BRPM | HEIGHT: 61 IN | BODY MASS INDEX: 22.41 KG/M2 | WEIGHT: 118.7 LBS

## 2023-06-15 DIAGNOSIS — Z01.30 BLOOD PRESSURE CHECK: Primary | ICD-10-CM

## 2023-06-15 NOTE — PROGRESS NOTES
"Chief Complaint  Hypertension    Subjective        Zehra Gong presents to [facility]  History of Present Illness  F/u on blood pressure. She has been getting elevated blood pressure readings at home.     I have reviewed the patient's medical history in detail and updated the computerized patient record.       Current Outpatient Medications:     calcium carbonate-vitamin d 600-400 MG-UNIT per tablet, Take 1 tablet by mouth Daily., Disp: , Rfl:     CALCIUM PO, Take  by mouth., Disp: , Rfl:     doxepin (SINEquan) 10 MG capsule, Take 1 capsule by mouth Every Night. Patient opening and dumping out half, Disp: 90 capsule, Rfl: 1    DULoxetine (CYMBALTA) 20 MG capsule, TAKE ONE CAPSULE BY MOUTH EVERY DAY, Disp: 90 capsule, Rfl: 1    fluticasone (FLONASE) 50 MCG/ACT nasal spray, SHAKE LIQUID AND USE 1 SPRAY IN EACH NOSTRIL DAILY FOR 14 DAYS, Disp: , Rfl:     hydrALAZINE (APRESOLINE) 10 MG tablet, Take 1 tablet by mouth As Needed (elevated blood pressure)., Disp: 30 tablet, Rfl: 5    valACYclovir (VALTREX) 500 MG tablet, Take 2 tablets by mouth Daily. As needed, Disp: 60 tablet, Rfl: 5     Objective   Vital Signs:  /64   Pulse 72   Resp 10   Ht 154.9 cm (61\")   Wt 53.8 kg (118 lb 11.2 oz)   SpO2 100%   BMI 22.43 kg/m²   Estimated body mass index is 22.43 kg/m² as calculated from the following:    Height as of this encounter: 154.9 cm (61\").    Weight as of this encounter: 53.8 kg (118 lb 11.2 oz).       BMI is within normal parameters. No other follow-up for BMI required.      Physical Exam  Constitutional:       Appearance: Normal appearance.   Cardiovascular:      Rate and Rhythm: Normal rate and regular rhythm.      Pulses: Normal pulses.      Heart sounds: Normal heart sounds.   Pulmonary:      Effort: Pulmonary effort is normal.      Breath sounds: Normal breath sounds.   Skin:     General: Skin is warm and dry.   Neurological:      Mental Status: She is alert and oriented " to person, place, and time.   Psychiatric:         Mood and Affect: Mood is anxious.         Speech: Speech is rapid and pressured.         Behavior: Behavior is hyperactive.      Result Review :                   Assessment and Plan   Diagnoses and all orders for this visit:    1. Blood pressure check (Primary)    Ms. Gong does not appear to be in any acute distress.  Her blood pressure in the office is stable at 118/64. I have reviewed her log and her blood pressure runs between 130-150/60-80. She has Hydralazine 10 mg to take as needed for blood pressure reading > 150 systolic and > 90 diastolic.   We discussed that any activity will cause an increase in her blood pressure and also her anxiety will cause and increase in her blood pressure. I have reassured her that she is doing fine.          Follow Up   Return if symptoms worsen or fail to improve, for Next scheduled follow up.  Patient was given instructions and counseling regarding her condition or for health maintenance advice. Please see specific information pulled into the AVS if appropriate.

## 2023-10-18 RX ORDER — VALACYCLOVIR HYDROCHLORIDE 500 MG/1
TABLET, FILM COATED ORAL
Qty: 60 TABLET | Refills: 5 | Status: SHIPPED | OUTPATIENT
Start: 2023-10-18

## 2024-01-08 DIAGNOSIS — E05.00 GRAVES DISEASE: ICD-10-CM

## 2024-01-08 DIAGNOSIS — E78.2 MIXED HYPERLIPIDEMIA: Primary | ICD-10-CM

## 2024-01-09 DIAGNOSIS — F41.9 ANXIETY: ICD-10-CM

## 2024-01-09 RX ORDER — DULOXETIN HYDROCHLORIDE 20 MG/1
CAPSULE, DELAYED RELEASE ORAL
Qty: 90 CAPSULE | Refills: 1 | Status: SHIPPED | OUTPATIENT
Start: 2024-01-09

## 2024-01-17 LAB
ALBUMIN SERPL-MCNC: 4.4 G/DL (ref 3.8–4.8)
ALBUMIN/GLOB SERPL: 1.8 {RATIO} (ref 1.2–2.2)
ALP SERPL-CCNC: 77 IU/L (ref 44–121)
ALT SERPL-CCNC: 15 IU/L (ref 0–32)
AST SERPL-CCNC: 18 IU/L (ref 0–40)
BILIRUB SERPL-MCNC: 0.7 MG/DL (ref 0–1.2)
BUN SERPL-MCNC: 20 MG/DL (ref 8–27)
BUN/CREAT SERPL: 25 (ref 12–28)
CALCIUM SERPL-MCNC: 9.8 MG/DL (ref 8.7–10.3)
CHLORIDE SERPL-SCNC: 104 MMOL/L (ref 96–106)
CHOLEST SERPL-MCNC: 192 MG/DL (ref 100–199)
CHOLEST/HDLC SERPL: 2.1 RATIO (ref 0–4.4)
CO2 SERPL-SCNC: 29 MMOL/L (ref 20–29)
CREAT SERPL-MCNC: 0.8 MG/DL (ref 0.57–1)
EGFRCR SERPLBLD CKD-EPI 2021: 75 ML/MIN/1.73
GLOBULIN SER CALC-MCNC: 2.4 G/DL (ref 1.5–4.5)
GLUCOSE SERPL-MCNC: 89 MG/DL (ref 70–99)
HDLC SERPL-MCNC: 90 MG/DL
LDLC SERPL CALC-MCNC: 90 MG/DL (ref 0–99)
POTASSIUM SERPL-SCNC: 4.5 MMOL/L (ref 3.5–5.2)
PROT SERPL-MCNC: 6.8 G/DL (ref 6–8.5)
SODIUM SERPL-SCNC: 142 MMOL/L (ref 134–144)
TRIGL SERPL-MCNC: 63 MG/DL (ref 0–149)
TSH SERPL DL<=0.005 MIU/L-ACNC: 1.83 UIU/ML (ref 0.45–4.5)
VLDLC SERPL CALC-MCNC: 12 MG/DL (ref 5–40)

## 2024-01-23 ENCOUNTER — OFFICE VISIT (OUTPATIENT)
Dept: FAMILY MEDICINE CLINIC | Facility: CLINIC | Age: 79
End: 2024-01-23
Payer: MEDICARE

## 2024-01-23 VITALS
BODY MASS INDEX: 22.75 KG/M2 | WEIGHT: 120.5 LBS | DIASTOLIC BLOOD PRESSURE: 78 MMHG | HEIGHT: 61 IN | HEART RATE: 63 BPM | SYSTOLIC BLOOD PRESSURE: 122 MMHG | OXYGEN SATURATION: 98 %

## 2024-01-23 DIAGNOSIS — Z12.31 ENCOUNTER FOR SCREENING MAMMOGRAM FOR MALIGNANT NEOPLASM OF BREAST: ICD-10-CM

## 2024-01-23 DIAGNOSIS — Z00.00 MEDICARE ANNUAL WELLNESS VISIT, SUBSEQUENT: Primary | ICD-10-CM

## 2024-01-23 DIAGNOSIS — M85.89 OSTEOPENIA OF MULTIPLE SITES: ICD-10-CM

## 2024-01-23 PROCEDURE — 96160 PT-FOCUSED HLTH RISK ASSMT: CPT | Performed by: NURSE PRACTITIONER

## 2024-01-23 PROCEDURE — 1170F FXNL STATUS ASSESSED: CPT | Performed by: NURSE PRACTITIONER

## 2024-01-23 PROCEDURE — 1160F RVW MEDS BY RX/DR IN RCRD: CPT | Performed by: NURSE PRACTITIONER

## 2024-01-23 PROCEDURE — G0439 PPPS, SUBSEQ VISIT: HCPCS | Performed by: NURSE PRACTITIONER

## 2024-01-23 PROCEDURE — 1159F MED LIST DOCD IN RCRD: CPT | Performed by: NURSE PRACTITIONER

## 2024-01-23 NOTE — PATIENT INSTRUCTIONS
Medicare Wellness  Personal Prevention Plan of Service     Date of Office Visit:    Encounter Provider:  THIEN Salgado  Place of Service:  [facility]  Patient Name: Zehra Gong  :  1945    As part of the Medicare Wellness portion of your visit today, we are providing you with this personalized preventive plan of services (PPPS). This plan is based upon recommendations of the [place] Preventive Services Task Force (USPSTF) and the Advisory Committee on Immunization Practices (ACIP).    This lists the preventive care services that should be considered, and provides dates of when you are due. Items listed as completed are up-to-date and do not require any further intervention.    Health Maintenance   Topic Date Due    TDAP/TD VACCINES (1 - Tdap) Never done    ANNUAL WELLNESS VISIT  2024    DXA SCAN  2024    LIPID PANEL  2025    COLORECTAL CANCER SCREENING  2031    HEPATITIS C SCREENING  Completed    COVID-19 Vaccine  Completed    INFLUENZA VACCINE  Completed    Pneumococcal Vaccine 65+  Completed    ZOSTER VACCINE  Discontinued       No orders of the defined types were placed in this encounter.      No follow-ups on file.

## 2024-01-23 NOTE — PROGRESS NOTES
The ABCs of the Annual Wellness Visit  Subsequent Medicare Wellness Visit    Subjective      Zehra Gong is a 78 y.o. female who presents for a Subsequent Medicare Wellness Visit.    The following portions of the patient's history were reviewed and   updated as appropriate: allergies, current medications, past family history, past medical history, past social history, past surgical history, and problem list.    Compared to one year ago, the patient feels her physical   health is the same.    Compared to one year ago, the patient feels her mental   health is the same.    Recent Hospitalizations:  She was not admitted to the hospital during the last year.       Current Medical Providers:  Patient Care Team:  Maru Kim APRN as PCP - General (Family Medicine)  Panda Rinaldi MD as Consulting Physician (Rheumatology)  Ewelina Childers MD as Consulting Physician (Obstetrics and Gynecology)  Georgette Winkler MD as Consulting Physician (Endocrinology)  George Sawyer MD as Consulting Physician (Hematology and Oncology)  Angela Hyman MD as Consulting Physician (Urogynecology)    Outpatient Medications Prior to Visit   Medication Sig Dispense Refill    calcium carbonate-vitamin d 600-400 MG-UNIT per tablet Take 1 tablet by mouth Daily.      doxepin (SINEquan) 10 MG capsule TAKE ONE CAPSULE BY MOUTH AT BEDTIME 90 capsule 1    DULoxetine (CYMBALTA) 20 MG capsule TAKE ONE CAPSULE BY MOUTH EVERY DAY 90 capsule 1    fluticasone (FLONASE) 50 MCG/ACT nasal spray SHAKE LIQUID AND USE 1 SPRAY IN EACH NOSTRIL DAILY FOR 14 DAYS      valACYclovir (VALTREX) 500 MG tablet TAKE TWO TABLETS BY MOUTH EVERY DAY AS NEEDED 60 tablet 5    CALCIUM PO Take  by mouth. (Patient not taking: Reported on 1/23/2024)      hydrALAZINE (APRESOLINE) 10 MG tablet Take 1 tablet by mouth As Needed (elevated blood pressure). (Patient not taking: Reported on 1/23/2024) 30 tablet 5     No facility-administered medications prior to visit.  "      No opioid medication identified on active medication list. I have reviewed chart for other potential  high risk medication/s and harmful drug interactions in the elderly.        Aspirin is not on active medication list.  Aspirin use is not indicated based on review of current medical condition/s. Risk of harm outweighs potential benefits.  .    Patient Active Problem List   Diagnosis    Insomnia    Anxiety     Advance Care Planning   Advance Care Planning     Advance Directive is not on file.  ACP discussion was held with the patient during this visit. Patient has an advance directive (not in EMR), copy requested.     Objective    Vitals:    24 1149   BP: 122/78   BP Location: Left arm   Patient Position: Sitting   Cuff Size: Adult   Pulse: 63   SpO2: 98%   Weight: 54.7 kg (120 lb 8 oz)   Height: 154.9 cm (60.98\")     Estimated body mass index is 22.78 kg/m² as calculated from the following:    Height as of this encounter: 154.9 cm (60.98\").    Weight as of this encounter: 54.7 kg (120 lb 8 oz).    BMI is within normal parameters. No other follow-up for BMI required.      Does the patient have evidence of cognitive impairment?   No    Lab Results   Component Value Date    CHLPL 192 2024    TRIG 63 2024    HDL 90 2024    LDL 90 2024    VLDL 12 2024          HEALTH RISK ASSESSMENT    Smoking Status:  Social History     Tobacco Use   Smoking Status Never   Smokeless Tobacco Never     Alcohol Consumption:  Social History     Substance and Sexual Activity   Alcohol Use No     Fall Risk Screen:    STEADI Fall Risk Assessment was completed, and patient is at LOW risk for falls.Assessment completed on:2024    Depression Screenin/23/2024    11:52 AM   PHQ-2/PHQ-9 Depression Screening   Little Interest or Pleasure in Doing Things 0-->not at all   Feeling Down, Depressed or Hopeless 0-->not at all   PHQ-9: Brief Depression Severity Measure Score 0       Health Habits and " Functional and Cognitive Screenin/23/2024    11:54 AM   Functional & Cognitive Status   Do you have difficulty preparing food and eating? No   Do you have difficulty bathing yourself, getting dressed or grooming yourself? No   Do you have difficulty using the toilet? No   Do you have difficulty moving around from place to place? No   Do you have trouble with steps or getting out of a bed or a chair? No   Current Diet Well Balanced Diet   Dental Exam Up to date   Eye Exam Up to date   Exercise (times per week) 7 times per week   Current Exercises Include Other;Light Weights        Exercise Comment stretches   Do you need help using the phone?  No   Are you deaf or do you have serious difficulty hearing?  No   Do you need help to go to places out of walking distance? No   Do you need help shopping? No   Do you need help preparing meals?  No   Do you need help with housework?  No   Do you need help with laundry? No   Do you need help taking your medications? No   Do you need help managing money? No   Do you ever drive or ride in a car without wearing a seat belt? No   Have you felt unusual stress, anger or loneliness in the last month? No   Who do you live with? Spouse   If you need help, do you have trouble finding someone available to you? No   Have you been bothered in the last four weeks by sexual problems? No   Do you have difficulty concentrating, remembering or making decisions? No       Age-appropriate Screening Schedule:  Refer to the list below for future screening recommendations based on patient's age, sex and/or medical conditions. Orders for these recommended tests are listed in the plan section. The patient has been provided with a written plan.    Health Maintenance   Topic Date Due    TDAP/TD VACCINES (1 - Tdap) Never done    ANNUAL WELLNESS VISIT  2024    DXA SCAN  2024    LIPID PANEL  2025    COLORECTAL CANCER SCREENING  2031    HEPATITIS C SCREENING  Completed     COVID-19 Vaccine  Completed    INFLUENZA VACCINE  Completed    Pneumococcal Vaccine 65+  Completed    ZOSTER VACCINE  Discontinued                  CMS Preventative Services Quick Reference  Risk Factors Identified During Encounter:    None Identified    The above risks/problems have been discussed with the patient.  Pertinent information has been shared with the patient in the After Visit Summary.    Diagnoses and all orders for this visit:    1. Medicare annual wellness visit, subsequent (Primary)        Follow Up:   Next Medicare Wellness visit to be scheduled in 1 year.      An After Visit Summary and PPPS were made available to the patient.

## 2024-02-12 ENCOUNTER — TELEPHONE (OUTPATIENT)
Dept: FAMILY MEDICINE CLINIC | Facility: CLINIC | Age: 79
End: 2024-02-12
Payer: MEDICARE

## 2024-04-26 ENCOUNTER — TELEPHONE (OUTPATIENT)
Dept: FAMILY MEDICINE CLINIC | Facility: CLINIC | Age: 79
End: 2024-04-26
Payer: MEDICARE

## 2024-04-26 NOTE — TELEPHONE ENCOUNTER
Caller: Zehra Gong    Relationship: Self    Best call back number: [phone]    What orders are you requesting (i.e. lab or imaging): ANNUAL MAMMOGRAM     In what timeframe would the patient need to come in: MAY    Where will you receive your lab/imaging services: [facility]     Additional notes: PATIENT STATES THAT SHE IS DUE FOR HER ANNUAL MAMMOGRAM AND SHE WOULD LIKE TO GO THROUGH [facility] WHERE SHE HAS GOTTEN THEM DONE IN THE PAST.

## 2024-04-26 NOTE — TELEPHONE ENCOUNTER
Called to inform patient that there is an active referral placed for her mammogram that went to [facility].

## 2024-05-29 ENCOUNTER — TELEPHONE (OUTPATIENT)
Dept: FAMILY MEDICINE CLINIC | Facility: CLINIC | Age: 79
End: 2024-05-29
Payer: MEDICARE

## 2024-07-09 DIAGNOSIS — F41.9 ANXIETY: ICD-10-CM

## 2024-07-09 RX ORDER — DULOXETIN HYDROCHLORIDE 20 MG/1
CAPSULE, DELAYED RELEASE ORAL
Qty: 90 CAPSULE | Refills: 1 | Status: SHIPPED | OUTPATIENT
Start: 2024-07-09

## 2024-08-02 ENCOUNTER — TELEPHONE (OUTPATIENT)
Dept: FAMILY MEDICINE CLINIC | Facility: CLINIC | Age: 79
End: 2024-08-02
Payer: MEDICARE

## 2024-08-02 DIAGNOSIS — R05.2 SUBACUTE COUGH: ICD-10-CM

## 2024-08-02 RX ORDER — BENZONATATE 100 MG/1
100 CAPSULE ORAL 3 TIMES DAILY PRN
Qty: 90 CAPSULE | Refills: 3 | OUTPATIENT
Start: 2024-08-02

## 2024-08-02 NOTE — TELEPHONE ENCOUNTER
She needs to be seen before I will fill it. If she is coughing and she is negative for covid, then there could be other reasons for her cough.

## 2024-08-02 NOTE — TELEPHONE ENCOUNTER
PT'S  HAS COVID, CURRENTLY AT [facility]. PT DOES HAVE SX (FEVER, COUGH) BUT TEST WAS NEG. REQUESTING REFILL ON [facility], NOT ON CURRENT MED LIST

## 2024-09-04 ENCOUNTER — TELEPHONE (OUTPATIENT)
Dept: FAMILY MEDICINE CLINIC | Facility: CLINIC | Age: 79
End: 2024-09-04

## 2024-09-04 NOTE — TELEPHONE ENCOUNTER
Caller: Zehra Gong    Relationship: Self    Best call back number: [phone]     What form or medical record are you requesting:   THIS YEARS DEXA SCAN RESULTS    Who is requesting this form or medical record from you: SELF    How would you like to receive the form or medical records (pick-up, mail, fax): PICKUP    Timeframe paperwork needed: ASAP    Additional notes:     PLEASE CALL TO LET HER KNOW WHEN READY TO .     OK TO LEAVE MESSAGE.

## 2025-01-23 ENCOUNTER — TELEPHONE (OUTPATIENT)
Dept: FAMILY MEDICINE CLINIC | Facility: CLINIC | Age: 80
End: 2025-01-23
Payer: MEDICARE

## 2025-01-23 NOTE — TELEPHONE ENCOUNTER
Patient going to [facility].  The facility has more diagnostic equipment than the [facility] office.

## 2025-02-25 NOTE — PROGRESS NOTES
Subjective   Zehra Gong is a 73 y.o. female.     Chief Complaint   Patient presents with   • Abscess     left hip      Cyst   This is a new problem. The current episode started 1 to 4 weeks ago. The problem occurs constantly. The problem has been waxing and waning. Associated symptoms comments: none. Nothing aggravates the symptoms. She has tried nothing for the symptoms.      I have reviewed the patient's medical history in detail and updated the computerized patient record.    The following portions of the patient's history were reviewed and updated as appropriate: allergies, current medications, past family history, past medical history, past social history, past surgical history and problem list.       Current Outpatient Medications:   •  calcium carbonate-vitamin d 600-400 MG-UNIT per tablet, Take 1 tablet by mouth Daily., Disp: , Rfl:   •  doxepin (SINEquan) 50 MG capsule, Take 50 mg by mouth Every Night., Disp: , Rfl:   •  DULoxetine (CYMBALTA) 20 MG capsule, Take 20 mg by mouth Daily., Disp: , Rfl:   •  ESTRACE VAGINAL 0.1 MG/GM vaginal cream, Insert 1 g into the vagina 1 (One) Time Per Week., Disp: 42.5 g, Rfl: 12  •  hydrOXYzine (ATARAX) 25 MG tablet, Take 25 mg by mouth 3 (Three) Times a Day As Needed for Itching., Disp: , Rfl:   •  LORazepam (ATIVAN) 0.5 MG tablet, Take 0.5 mg by mouth Every 8 (Eight) Hours As Needed for Anxiety., Disp: , Rfl:   •  risperiDONE (risperDAL) 0.5 MG tablet, Take 0.5 mg by mouth 2 (Two) Times a Day., Disp: , Rfl:   •  valACYclovir (VALTREX) 500 MG tablet, Take 2 tablets by mouth Daily., Disp: 60 tablet, Rfl: 2  •  mupirocin (BACTROBAN) 2 % cream, Apply  topically to the appropriate area as directed 3 (Three) Times a Day., Disp: 15 g, Rfl: 3    Review of Systems   Constitutional: Negative.    Respiratory: Negative.    Cardiovascular: Negative.    Skin: Positive for skin lesions (left ceek of her buttocks).   Neurological: Negative.         Vitals:    11/06/19 1019   BP:  "116/78   BP Location: Right arm   Patient Position: Sitting   Cuff Size: Adult   Pulse: 68   Resp: 18   Temp: 98.1 °F (36.7 °C)   TempSrc: Oral   SpO2: 99%   Weight: 54.7 kg (120 lb 11.2 oz)   Height: 154.9 cm (61\")       Objective   Physical Exam   Constitutional: She is oriented to person, place, and time. She appears well-developed and well-nourished.   Neurological: She is alert and oriented to person, place, and time.   Skin: Skin is warm, dry and intact.   She has a dime size abscess/cyst that has come to a head and is now drying and healing. No signs of infection. Located on the left buttocks   Psychiatric: Her behavior is normal. Judgment and thought content normal. Her mood appears anxious. Her speech is rapid and/or pressured. Cognition and memory are normal.   Vitals reviewed.        Assessment/Plan   Zehra was seen today for abscess.    Diagnoses and all orders for this visit:    Sebaceous cyst    Other orders  -     mupirocin (BACTROBAN) 2 % cream; Apply  topically to the appropriate area as directed 3 (Three) Times a Day.  -     valACYclovir (VALTREX) 500 MG tablet; Take 2 tablets by mouth Daily.      Ms. Gong presents today to have a sebaceous cyst on her buttocks evaluated. She first noticed the cyst a week ago when she was an inpatient at [facility]. While she was there she was keeping it clean with soap and water. The area is healing without any signs of infection. She may apply Bactroban 2 % cream to the affected are as needed.   She did talk with me some about her recent attempt at suicide. She  Did not say much or go into detail, only that she did try to take her life and that she was in [facility]. She also brought a list with her on her current up dated medications which I have reviewed and reconciled.   She is to follow up as needed with me and at her next scheduled appointment.          " Detail Level: Zone